# Patient Record
Sex: MALE | Race: BLACK OR AFRICAN AMERICAN | NOT HISPANIC OR LATINO | Employment: OTHER | URBAN - METROPOLITAN AREA
[De-identification: names, ages, dates, MRNs, and addresses within clinical notes are randomized per-mention and may not be internally consistent; named-entity substitution may affect disease eponyms.]

---

## 2019-01-01 ENCOUNTER — OFFICE VISIT (OUTPATIENT)
Dept: FAMILY MEDICINE CLINIC | Facility: CLINIC | Age: 64
End: 2019-01-01

## 2019-01-01 ENCOUNTER — HOSPITAL ENCOUNTER (OUTPATIENT)
Dept: CT IMAGING | Facility: HOSPITAL | Age: 64
Discharge: HOME | End: 2019-09-29
Admitting: FAMILY MEDICINE

## 2019-01-01 ENCOUNTER — HOSPITAL ENCOUNTER (EMERGENCY)
Facility: HOSPITAL | Age: 64
Discharge: HOME | End: 2019-12-21
Attending: EMERGENCY MEDICINE | Admitting: EMERGENCY MEDICINE

## 2019-01-01 ENCOUNTER — TELEPHONE (OUTPATIENT)
Dept: FAMILY MEDICINE CLINIC | Facility: CLINIC | Age: 64
End: 2019-01-01

## 2019-01-01 ENCOUNTER — OFFICE VISIT (OUTPATIENT)
Dept: GASTROENTEROLOGY | Facility: CLINIC | Age: 64
End: 2019-01-01

## 2019-01-01 VITALS
HEART RATE: 97 BPM | WEIGHT: 177.6 LBS | BODY MASS INDEX: 24.06 KG/M2 | TEMPERATURE: 97.6 F | HEIGHT: 72 IN | DIASTOLIC BLOOD PRESSURE: 80 MMHG | OXYGEN SATURATION: 97 % | SYSTOLIC BLOOD PRESSURE: 110 MMHG

## 2019-01-01 VITALS
BODY MASS INDEX: 28.04 KG/M2 | TEMPERATURE: 98.5 F | DIASTOLIC BLOOD PRESSURE: 84 MMHG | OXYGEN SATURATION: 98 % | WEIGHT: 211.6 LBS | HEIGHT: 73 IN | SYSTOLIC BLOOD PRESSURE: 110 MMHG | HEART RATE: 90 BPM

## 2019-01-01 VITALS
TEMPERATURE: 98.4 F | HEIGHT: 73 IN | WEIGHT: 177.2 LBS | SYSTOLIC BLOOD PRESSURE: 122 MMHG | BODY MASS INDEX: 23.49 KG/M2 | DIASTOLIC BLOOD PRESSURE: 72 MMHG

## 2019-01-01 VITALS
SYSTOLIC BLOOD PRESSURE: 112 MMHG | BODY MASS INDEX: 24.78 KG/M2 | DIASTOLIC BLOOD PRESSURE: 72 MMHG | OXYGEN SATURATION: 96 % | HEIGHT: 73 IN | HEART RATE: 106 BPM | TEMPERATURE: 98.8 F | WEIGHT: 187 LBS

## 2019-01-01 VITALS
WEIGHT: 180 LBS | OXYGEN SATURATION: 100 % | HEART RATE: 97 BPM | RESPIRATION RATE: 16 BRPM | DIASTOLIC BLOOD PRESSURE: 90 MMHG | TEMPERATURE: 98.6 F | BODY MASS INDEX: 24.38 KG/M2 | HEIGHT: 72 IN | SYSTOLIC BLOOD PRESSURE: 127 MMHG

## 2019-01-01 DIAGNOSIS — I10 ESSENTIAL HYPERTENSION: Primary | ICD-10-CM

## 2019-01-01 DIAGNOSIS — R63.4 UNINTENTIONAL WEIGHT LOSS: Primary | ICD-10-CM

## 2019-01-01 DIAGNOSIS — R10.30 LOWER ABDOMINAL PAIN: ICD-10-CM

## 2019-01-01 DIAGNOSIS — E87.6 HYPOKALEMIA: Primary | ICD-10-CM

## 2019-01-01 DIAGNOSIS — R63.4 UNINTENTIONAL WEIGHT LOSS: ICD-10-CM

## 2019-01-01 DIAGNOSIS — R63.4 WEIGHT LOSS, ABNORMAL: ICD-10-CM

## 2019-01-01 DIAGNOSIS — K59.04 CHRONIC IDIOPATHIC CONSTIPATION: ICD-10-CM

## 2019-01-01 DIAGNOSIS — R10.84 GENERALIZED ABDOMINAL PAIN: ICD-10-CM

## 2019-01-01 DIAGNOSIS — K59.01 SLOW TRANSIT CONSTIPATION: Primary | ICD-10-CM

## 2019-01-01 DIAGNOSIS — C61 PROSTATE CANCER (HCC): ICD-10-CM

## 2019-01-01 DIAGNOSIS — R10.13 EPIGASTRIC PAIN: Primary | ICD-10-CM

## 2019-01-01 LAB
ALBUMIN SERPL-MCNC: 4.1 G/DL (ref 3.5–5.2)
ALBUMIN SERPL-MCNC: 4.6 G/DL (ref 3.6–4.8)
ALBUMIN/GLOB SERPL: 1.3 G/DL
ALBUMIN/GLOB SERPL: 1.5 {RATIO} (ref 1.2–2.2)
ALP SERPL-CCNC: 61 U/L (ref 39–117)
ALP SERPL-CCNC: 82 IU/L (ref 39–117)
ALT SERPL W P-5'-P-CCNC: 17 U/L (ref 1–41)
ALT SERPL-CCNC: 10 IU/L (ref 0–44)
ANION GAP SERPL CALCULATED.3IONS-SCNC: 13.3 MMOL/L (ref 5–15)
AST SERPL-CCNC: 15 U/L (ref 1–40)
AST SERPL-CCNC: 16 IU/L (ref 0–40)
BACTERIA UR QL AUTO: ABNORMAL /HPF
BASOPHILS # BLD AUTO: 0 X10E3/UL (ref 0–0.2)
BASOPHILS # BLD AUTO: 0.03 10*3/MM3 (ref 0–0.2)
BASOPHILS NFR BLD AUTO: 0 %
BASOPHILS NFR BLD AUTO: 0.3 % (ref 0–1.5)
BILIRUB SERPL-MCNC: 1.2 MG/DL (ref 0–1.2)
BILIRUB SERPL-MCNC: 1.3 MG/DL (ref 0.2–1.2)
BILIRUB UR QL STRIP: NEGATIVE
BUN BLD-MCNC: 16 MG/DL (ref 8–23)
BUN SERPL-MCNC: 11 MG/DL (ref 8–27)
BUN/CREAT SERPL: 11 (ref 7–25)
BUN/CREAT SERPL: 8 (ref 10–24)
CALCIUM SERPL-MCNC: 10.6 MG/DL (ref 8.6–10.2)
CALCIUM SPEC-SCNC: 9.7 MG/DL (ref 8.6–10.5)
CHLORIDE SERPL-SCNC: 98 MMOL/L (ref 98–107)
CHLORIDE SERPL-SCNC: 99 MMOL/L (ref 96–106)
CHOLEST SERPL-MCNC: 179 MG/DL (ref 100–199)
CHOLEST/HDLC SERPL: 2.8 RATIO (ref 0–5)
CLARITY UR: CLEAR
CO2 SERPL-SCNC: 24 MMOL/L (ref 20–29)
CO2 SERPL-SCNC: 28.7 MMOL/L (ref 22–29)
COLOR UR: YELLOW
CREAT BLD-MCNC: 1.46 MG/DL (ref 0.76–1.27)
CREAT BLDA-MCNC: 1 MG/DL (ref 0.6–1.3)
CREAT SERPL-MCNC: 1.32 MG/DL (ref 0.76–1.27)
DEPRECATED RDW RBC AUTO: 41.7 FL (ref 37–54)
EOSINOPHIL # BLD AUTO: 0.08 10*3/MM3 (ref 0–0.4)
EOSINOPHIL # BLD AUTO: 0.1 X10E3/UL (ref 0–0.4)
EOSINOPHIL NFR BLD AUTO: 0.9 % (ref 0.3–6.2)
EOSINOPHIL NFR BLD AUTO: 1 %
ERYTHROCYTE [DISTWIDTH] IN BLOOD BY AUTOMATED COUNT: 14 % (ref 12.3–15.4)
ERYTHROCYTE [DISTWIDTH] IN BLOOD BY AUTOMATED COUNT: 14.7 % (ref 12.3–15.4)
GFR SERPL CREATININE-BSD FRML MDRD: 59 ML/MIN/1.73
GLOBULIN SER CALC-MCNC: 3.1 G/DL (ref 1.5–4.5)
GLOBULIN UR ELPH-MCNC: 3.2 GM/DL
GLUCOSE BLD-MCNC: 106 MG/DL (ref 65–99)
GLUCOSE SERPL-MCNC: 87 MG/DL (ref 65–99)
GLUCOSE UR STRIP-MCNC: NEGATIVE MG/DL
HCT VFR BLD AUTO: 42.8 % (ref 37.5–51)
HCT VFR BLD AUTO: 44 % (ref 37.5–51)
HCV AB S/CO SERPL IA: <0.1 S/CO RATIO (ref 0–0.9)
HDLC SERPL-MCNC: 64 MG/DL
HGB BLD-MCNC: 14.4 G/DL (ref 13–17.7)
HGB BLD-MCNC: 14.7 G/DL (ref 13–17.7)
HGB UR QL STRIP.AUTO: ABNORMAL
HYALINE CASTS UR QL AUTO: ABNORMAL /LPF
IMM GRANULOCYTES # BLD AUTO: 0 X10E3/UL (ref 0–0.1)
IMM GRANULOCYTES # BLD AUTO: 0.04 10*3/MM3 (ref 0–0.05)
IMM GRANULOCYTES NFR BLD AUTO: 0 %
IMM GRANULOCYTES NFR BLD AUTO: 0.5 % (ref 0–0.5)
KETONES UR QL STRIP: ABNORMAL
LDLC SERPL CALC-MCNC: 103 MG/DL (ref 0–99)
LEUKOCYTE ESTERASE UR QL STRIP.AUTO: ABNORMAL
LIPASE SERPL-CCNC: 60 U/L (ref 13–60)
LYMPHOCYTES # BLD AUTO: 1.43 10*3/MM3 (ref 0.7–3.1)
LYMPHOCYTES # BLD AUTO: 1.6 X10E3/UL (ref 0.7–3.1)
LYMPHOCYTES NFR BLD AUTO: 16.3 % (ref 19.6–45.3)
LYMPHOCYTES NFR BLD AUTO: 19 %
MCH RBC QN AUTO: 27.3 PG (ref 26.6–33)
MCH RBC QN AUTO: 28.6 PG (ref 26.6–33)
MCHC RBC AUTO-ENTMCNC: 32.7 G/DL (ref 31.5–35.7)
MCHC RBC AUTO-ENTMCNC: 34.3 G/DL (ref 31.5–35.7)
MCV RBC AUTO: 83.3 FL (ref 79–97)
MCV RBC AUTO: 84 FL (ref 79–97)
MONOCYTES # BLD AUTO: 0.6 X10E3/UL (ref 0.1–0.9)
MONOCYTES # BLD AUTO: 0.8 10*3/MM3 (ref 0.1–0.9)
MONOCYTES NFR BLD AUTO: 7 %
MONOCYTES NFR BLD AUTO: 9.1 % (ref 5–12)
NEUTROPHILS # BLD AUTO: 6.4 X10E3/UL (ref 1.4–7)
NEUTROPHILS # BLD AUTO: 6.42 10*3/MM3 (ref 1.7–7)
NEUTROPHILS NFR BLD AUTO: 72.9 % (ref 42.7–76)
NEUTROPHILS NFR BLD AUTO: 73 %
NITRITE UR QL STRIP: NEGATIVE
NRBC BLD AUTO-RTO: 0 /100 WBC (ref 0–0.2)
PH UR STRIP.AUTO: 6 [PH] (ref 5–8)
PLATELET # BLD AUTO: 235 10*3/MM3 (ref 140–450)
PLATELET # BLD AUTO: 280 X10E3/UL (ref 150–450)
PMV BLD AUTO: 10.2 FL (ref 6–12)
POTASSIUM BLD-SCNC: 3.6 MMOL/L (ref 3.5–5.2)
POTASSIUM SERPL-SCNC: 4 MMOL/L (ref 3.5–5.2)
PROT SERPL-MCNC: 7.3 G/DL (ref 6–8.5)
PROT SERPL-MCNC: 7.7 G/DL (ref 6–8.5)
PROT UR QL STRIP: NEGATIVE
PSA SERPL-MCNC: <0.1 NG/ML (ref 0–4)
RBC # BLD AUTO: 5.14 10*6/MM3 (ref 4.14–5.8)
RBC # BLD AUTO: 5.27 X10E6/UL (ref 4.14–5.8)
RBC # UR: ABNORMAL /HPF
REF LAB TEST METHOD: ABNORMAL
SODIUM BLD-SCNC: 140 MMOL/L (ref 136–145)
SODIUM SERPL-SCNC: 144 MMOL/L (ref 134–144)
SP GR UR STRIP: 1.02 (ref 1–1.03)
SQUAMOUS #/AREA URNS HPF: ABNORMAL /HPF
TRIGL SERPL-MCNC: 60 MG/DL (ref 0–149)
TSH SERPL DL<=0.005 MIU/L-ACNC: 0.72 UIU/ML (ref 0.45–4.5)
UROBILINOGEN UR QL STRIP: ABNORMAL
VLDLC SERPL CALC-MCNC: 12 MG/DL (ref 5–40)
WBC # BLD AUTO: 8.8 X10E3/UL (ref 3.4–10.8)
WBC NRBC COR # BLD: 8.8 10*3/MM3 (ref 3.4–10.8)
WBC UR QL AUTO: ABNORMAL /HPF

## 2019-01-01 PROCEDURE — 83690 ASSAY OF LIPASE: CPT | Performed by: PHYSICIAN ASSISTANT

## 2019-01-01 PROCEDURE — 71260 CT THORAX DX C+: CPT

## 2019-01-01 PROCEDURE — 82565 ASSAY OF CREATININE: CPT

## 2019-01-01 PROCEDURE — 80053 COMPREHEN METABOLIC PANEL: CPT | Performed by: PHYSICIAN ASSISTANT

## 2019-01-01 PROCEDURE — 99214 OFFICE O/P EST MOD 30 MIN: CPT | Performed by: FAMILY MEDICINE

## 2019-01-01 PROCEDURE — 81001 URINALYSIS AUTO W/SCOPE: CPT | Performed by: PHYSICIAN ASSISTANT

## 2019-01-01 PROCEDURE — 85025 COMPLETE CBC W/AUTO DIFF WBC: CPT | Performed by: PHYSICIAN ASSISTANT

## 2019-01-01 PROCEDURE — 74177 CT ABD & PELVIS W/CONTRAST: CPT

## 2019-01-01 PROCEDURE — 25010000002 IOPAMIDOL 61 % SOLUTION: Performed by: FAMILY MEDICINE

## 2019-01-01 PROCEDURE — 99204 OFFICE O/P NEW MOD 45 MIN: CPT | Performed by: INTERNAL MEDICINE

## 2019-01-01 PROCEDURE — 99283 EMERGENCY DEPT VISIT LOW MDM: CPT

## 2019-01-01 PROCEDURE — 99213 OFFICE O/P EST LOW 20 MIN: CPT | Performed by: FAMILY MEDICINE

## 2019-01-01 RX ORDER — POTASSIUM CHLORIDE 750 MG/1
10 TABLET, FILM COATED, EXTENDED RELEASE ORAL DAILY
Qty: 90 TABLET | Refills: 3
Start: 2019-01-01 | End: 2019-01-01 | Stop reason: SDUPTHER

## 2019-01-01 RX ORDER — HYDROCHLOROTHIAZIDE 25 MG/1
25 TABLET ORAL
COMMUNITY
End: 2019-01-01 | Stop reason: SDUPTHER

## 2019-01-01 RX ORDER — TRIAMCINOLONE ACETONIDE 1 MG/G
CREAM TOPICAL 2 TIMES DAILY
Start: 2019-01-01 | End: 2019-01-01 | Stop reason: SDUPTHER

## 2019-01-01 RX ORDER — HYDROCHLOROTHIAZIDE 25 MG/1
25 TABLET ORAL DAILY
Qty: 90 TABLET | Refills: 3 | Status: SHIPPED | OUTPATIENT
Start: 2019-01-01 | End: 2020-01-01

## 2019-01-01 RX ORDER — SENNA AND DOCUSATE SODIUM 50; 8.6 MG/1; MG/1
2 TABLET, FILM COATED ORAL DAILY
Qty: 60 TABLET | Refills: 3 | Status: SHIPPED | OUTPATIENT
Start: 2019-01-01

## 2019-01-01 RX ORDER — TRIAMCINOLONE ACETONIDE 1 MG/G
CREAM TOPICAL 2 TIMES DAILY
Qty: 80 G | Refills: 2
Start: 2019-01-01 | End: 2019-01-01 | Stop reason: SDUPTHER

## 2019-01-01 RX ORDER — TRIAMCINOLONE ACETONIDE 1 MG/G
CREAM TOPICAL 2 TIMES DAILY
Qty: 80 G | Refills: 2 | Status: SHIPPED | OUTPATIENT
Start: 2019-01-01

## 2019-01-01 RX ORDER — PANTOPRAZOLE SODIUM 40 MG/1
40 TABLET, DELAYED RELEASE ORAL DAILY
Qty: 30 TABLET | Refills: 4 | Status: SHIPPED | OUTPATIENT
Start: 2019-01-01 | End: 2020-01-01 | Stop reason: SDUPTHER

## 2019-01-01 RX ORDER — TAMSULOSIN HYDROCHLORIDE 0.4 MG/1
1 CAPSULE ORAL DAILY
Qty: 30 CAPSULE
Start: 2019-01-01 | End: 2019-01-01

## 2019-01-01 RX ORDER — OMEPRAZOLE 40 MG/1
40 CAPSULE, DELAYED RELEASE ORAL DAILY
Start: 2019-01-01 | End: 2019-01-01

## 2019-01-01 RX ORDER — NAPROXEN 500 MG/1
500 TABLET ORAL 2 TIMES DAILY WITH MEALS
Start: 2019-01-01 | End: 2019-01-01

## 2019-01-01 RX ORDER — POTASSIUM CHLORIDE 750 MG/1
10 TABLET, FILM COATED, EXTENDED RELEASE ORAL DAILY
Start: 2019-01-01 | End: 2019-01-01 | Stop reason: SDUPTHER

## 2019-01-01 RX ORDER — FERROUS SULFATE 325(65) MG
325 TABLET ORAL
Start: 2019-01-01 | End: 2019-01-01

## 2019-01-01 RX ORDER — POTASSIUM CHLORIDE 750 MG/1
10 TABLET, FILM COATED, EXTENDED RELEASE ORAL DAILY
Qty: 90 TABLET | Refills: 3 | Status: SHIPPED | OUTPATIENT
Start: 2019-01-01 | End: 2020-01-01

## 2019-01-01 RX ADMIN — IOPAMIDOL 75 ML: 612 INJECTION, SOLUTION INTRAVENOUS at 11:25

## 2019-09-16 PROBLEM — R63.4 UNINTENTIONAL WEIGHT LOSS: Status: ACTIVE | Noted: 2019-01-01

## 2019-09-16 PROBLEM — Z96.642 HISTORY OF TOTAL LEFT HIP ARTHROPLASTY: Status: ACTIVE | Noted: 2018-07-22

## 2019-09-16 NOTE — PROGRESS NOTES
Chief Complaint   Patient presents with   • Hypertension   • Weight Loss       Subjective   Leander Mayes is a 64 y.o. male.     History of Present Illness     F/U HTN.  No orthostasis.  On meds regulalry.    C/o loss of weight over last 18 months. 76 lbs weight loss.   Not seen by me per pt report since 4/18.  Has had 6 surgeries during that time.  Loss of appetite as well. I cannot gain weight.  I am feeling good though.  F/U prostate ca.  S/p prostatectomy.  Sees Dr Hanson.       The following portions of the patient's history were reviewed and updated as appropriate: allergies, current medications, past family history, past medical history, past social history, past surgical history and problem list.    Review of Systems   Constitutional: Positive for appetite change and unexpected weight loss. Negative for fatigue.   HENT: Negative for nosebleeds and sore throat.    Eyes: Negative for blurred vision and visual disturbance.   Respiratory: Negative for shortness of breath and wheezing.    Cardiovascular: Negative for chest pain and leg swelling.   Gastrointestinal: Negative for abdominal distention and abdominal pain.   Endocrine: Negative for cold intolerance and polyuria.   Genitourinary: Negative for dysuria and hematuria.   Musculoskeletal: Negative for arthralgias and myalgias.   Skin: Negative for color change and rash.   Neurological: Negative for weakness and confusion.   Psychiatric/Behavioral: Negative for agitation and depressed mood.       Patient Active Problem List   Diagnosis   • Venous stasis   • Stage 2 chronic kidney disease   • Spondylosis without myelopathy or radiculopathy, lumbar region   • Spinal stenosis, lumbosacral region   • Renal cyst, acquired   • Prostate cancer (CMS/HCC)   • Osteoarthritis of knees, bilateral   • Inability to attain erection   • Hypokalemia   • Hypertension   • High risk medication use   • GERD without esophagitis   • Depression   • Degenerative disc disease, lumbar    • Anemia   • BPH (benign prostatic hyperplasia)   • History of total left hip arthroplasty   • Unintentional weight loss       No Known Allergies      Current Outpatient Medications:   •  aspirin 81 MG tablet, Take 1 tablet by mouth Daily., Disp: , Rfl:   •  hydrochlorothiazide (HYDRODIURIL) 25 MG tablet, Take 25 mg by mouth., Disp: , Rfl:   •  potassium chloride (K-DUR) 10 MEQ CR tablet, Take 1 tablet by mouth Daily., Disp: , Rfl:   •  triamcinolone (KENALOG) 0.1 % cream, Apply  topically to the appropriate area as directed 2 (Two) Times a Day., Disp: , Rfl:     Past Medical History:   Diagnosis Date   • Anemia    • Arthritis    • Arthritis of left hip    • BPH (benign prostatic hyperplasia)    • Degenerative disc disease, lumbar    • Depression    • GERD without esophagitis    • Hematochezia    • High risk medication use    • Hip fracture (CMS/HCC)    • Hypertension    • Hypokalemia    • Inability to attain erection    • Lumbago with sciatica    • Osteoarthritis    • Osteoarthritis of knees, bilateral    • Postprocedural seroma of skin and subcutaneous tissue following other procedure    • Prostate cancer (CMS/HCC)    • Rash    • Renal cyst, acquired    • Rib fracture    • Right inguinal hernia    • Rotator cuff tendonitis, right    • Spinal stenosis, lumbosacral region    • Spondylosis without myelopathy or radiculopathy, lumbar region    • Stage 2 chronic kidney disease    • Umbilical hernia    • Venous stasis        Past Surgical History:   Procedure Laterality Date   • COLONOSCOPY  12/2016    normal   • INGUINAL HERNIA REPAIR Right    • KNEE SURGERY     • SUPRAPUBIC PROSTATECTOMY     • TOTAL HIP ARTHROPLASTY Bilateral        Family History   Problem Relation Age of Onset   • Diabetes type I Mother    • Hypertension Mother    • Alcohol abuse Father    • Arthritis Maternal Grandmother    • Arthritis Paternal Grandmother    • No Known Problems Other        Social History     Tobacco Use   • Smoking status:  Never Smoker   • Smokeless tobacco: Never Used   Substance Use Topics   • Alcohol use: Yes     Comment: Occasionally-drinks alcohol, 7 or less drinks per week            Objective     Vitals:    09/16/19 1512   BP: 110/84   Pulse: 90   Temp: 98.5 °F (36.9 °C)   SpO2: 98%     Body mass index is 27.92 kg/m².    Physical Exam   Constitutional: He is oriented to person, place, and time. He appears well-developed and well-nourished.   HENT:   Head: Normocephalic and atraumatic.   Right Ear: External ear normal.   Left Ear: External ear normal.   Nose: Nose normal.   Mouth/Throat: Oropharynx is clear and moist.   Eyes: Conjunctivae and EOM are normal. Pupils are equal, round, and reactive to light.   Neck: Normal range of motion. Neck supple. No tracheal deviation present. No thyromegaly present.   Cardiovascular: Normal rate, regular rhythm and normal heart sounds. Exam reveals no gallop and no friction rub.   No murmur heard.  Pulmonary/Chest: Effort normal and breath sounds normal. No respiratory distress. He exhibits no tenderness.   Abdominal: Soft. Bowel sounds are normal. He exhibits no distension. There is no tenderness.   Musculoskeletal: Normal range of motion. He exhibits no edema or tenderness.   Lymphadenopathy:     He has no cervical adenopathy.   Neurological: He is alert and oriented to person, place, and time. He displays normal reflexes. No cranial nerve deficit or sensory deficit. Coordination normal.   Skin: Skin is warm and dry.   Psychiatric: He has a normal mood and affect. His behavior is normal. Judgment and thought content normal.   Nursing note and vitals reviewed.      Lab Results   Component Value Date    GLUCOSE 99 06/05/2014    BUN 19 04/22/2019    CREATININE 1.3 04/22/2019    BCR 14.6 04/22/2019    K 3.4 (L) 04/22/2019    CO2 31 (H) 04/22/2019    CALCIUM 8.9 04/22/2019    ALBUMIN 3.9 04/22/2019    LABIL2 1.1 04/22/2019    AST 18 04/22/2019    ALT 13 04/22/2019       WBC   Date Value Ref  Range Status   03/26/2019 8.32 4.5 - 11.0 10*3/uL Final     RBC   Date Value Ref Range Status   03/26/2019 5.19 4.5 - 5.9 10*6/uL Final     Hemoglobin   Date Value Ref Range Status   04/22/2019 13.8 13.5 - 17.5 g/dL Final     Hematocrit   Date Value Ref Range Status   04/22/2019 42.3 41.0 - 53.0 % Final     MCV   Date Value Ref Range Status   03/26/2019 85.2 80.0 - 100.0 fL Final     MCH   Date Value Ref Range Status   03/26/2019 26.6 26.0 - 34.0 pg Final     MCHC   Date Value Ref Range Status   03/26/2019 31.2 31.0 - 37.0 g/dL Final     RDW   Date Value Ref Range Status   03/26/2019 17.0 (H) 12.0 - 16.8 % Final     MPV   Date Value Ref Range Status   03/26/2019 9.8 6.7 - 10.8 fL Final     Platelets   Date Value Ref Range Status   03/26/2019 231 140 - 440 10*3/uL Final     Neutrophil Rel %   Date Value Ref Range Status   03/26/2019 74.7 45 - 80 % Final     Lymphocyte Rel %   Date Value Ref Range Status   03/26/2019 17.2 15 - 50 % Final     Monocyte Rel %   Date Value Ref Range Status   03/26/2019 5.8 0 - 15 % Final     Eosinophil %   Date Value Ref Range Status   03/26/2019 2.0 0 - 7 % Final     Basophil Rel %   Date Value Ref Range Status   03/26/2019 0.2 0 - 2 % Final     Immature Grans %   Date Value Ref Range Status   03/26/2019 0.1 (H) 0 % Final     Neutrophils Absolute   Date Value Ref Range Status   03/26/2019 6.21 2.0 - 8.8 10*3/uL Final     Lymphocytes Absolute   Date Value Ref Range Status   03/26/2019 1.43 0.7 - 5.5 10*3/uL Final     Monocytes Absolute   Date Value Ref Range Status   03/26/2019 0.48 0.0 - 1.7 10*3/uL Final     Eosinophils Absolute   Date Value Ref Range Status   03/26/2019 0.17 0.0 - 0.8 10*3/uL Final     Basophils Absolute   Date Value Ref Range Status   03/26/2019 0.02 0.0 - 0.2 10*3/uL Final     Immature Grans, Absolute   Date Value Ref Range Status   03/26/2019 0.01 <1 10*3/uL Final     nRBC   Date Value Ref Range Status   03/26/2019 0 0 /100(WBC) Final       No results found for:  HGBA1C    Lab Results   Component Value Date    [id number] 864 (H) 07/22/2018       No results found for: TSH    No results found for: CHOL  No results found for: TRIG  No results found for: HDL  No results found for: LDL  No results found for: VLDL  No results found for: LDLHDL      Procedures    Assessment/Plan   Problems Addressed this Visit        Cardiovascular and Mediastinum    Hypertension - Primary    Relevant Medications    hydrochlorothiazide (HYDRODIURIL) 25 MG tablet       Genitourinary    Prostate cancer (CMS/HCC)       Other    Unintentional weight loss          No orders of the defined types were placed in this encounter.      Current Outpatient Medications   Medication Sig Dispense Refill   • aspirin 81 MG tablet Take 1 tablet by mouth Daily.     • hydrochlorothiazide (HYDRODIURIL) 25 MG tablet Take 25 mg by mouth.     • potassium chloride (K-DUR) 10 MEQ CR tablet Take 1 tablet by mouth Daily.     • triamcinolone (KENALOG) 0.1 % cream Apply  topically to the appropriate area as directed 2 (Two) Times a Day.       No current facility-administered medications for this visit.        Leander Mayes had no medications administered during this visit.    Return in about 3 months (around 12/16/2019).    There are no Patient Instructions on file for this visit.

## 2019-09-30 NOTE — TELEPHONE ENCOUNTER
Can you please resend the potassium chloride (K-DUR) 10 MEQ CR tablet. It was sent to print instead of [facility]. When resending please only send a 30 day supply to [facility]. Then send a 90 day supply to [facility].   Please note he is out of medication.    Pt also is inquiring about his CT results

## 2019-12-03 PROBLEM — K59.04 CHRONIC IDIOPATHIC CONSTIPATION: Status: ACTIVE | Noted: 2019-01-01

## 2019-12-03 PROBLEM — R10.84 GENERALIZED ABDOMINAL PAIN: Status: ACTIVE | Noted: 2019-01-01

## 2019-12-03 NOTE — PROGRESS NOTES
Chief Complaint   Patient presents with   • Constipation   • decrease of appetite       Subjective   Leander FREEDMAN Young is a 64 y.o. male.     History of Present Illness   C/o trouble with loss of wt with abdominal pain for months. About 3 months.  Review of wt with 24 lb since mid 9/19.  Some throwing up about 1 am many nights.   Increaseing severity.  Decreased appetite. Rare BM now.  Lot of constipation.   Review of labs with PSA normal,    TSH normal, Elevated Ca at 10.6, Cr 1.32.  LFTs normal.  CBC normal.  CT chest/abd/pelvis with multiple hepatic cysts and renal cyst and mod chronic diverticulosis.  Extensive multi level DDD.  Normal C scope 12/2016.  NO HA or vision changes.      The following portions of the patient's history were reviewed and updated as appropriate: allergies, current medications, past family history, past medical history, past social history, past surgical history and problem list.    Review of Systems   Constitutional: Positive for appetite change and unexpected weight loss. Negative for chills and fatigue.   HENT: Negative for nosebleeds and sore throat.    Eyes: Negative for blurred vision and visual disturbance.   Respiratory: Negative for shortness of breath and wheezing.    Cardiovascular: Negative for chest pain and leg swelling.   Gastrointestinal: Positive for abdominal pain and constipation. Negative for abdominal distention and diarrhea.   Endocrine: Negative for cold intolerance and polyuria.   Genitourinary: Negative for dysuria and hematuria.   Musculoskeletal: Negative for arthralgias and myalgias.   Skin: Negative for color change and rash.   Neurological: Negative for weakness and confusion.   Psychiatric/Behavioral: Negative for agitation and depressed mood.       Patient Active Problem List   Diagnosis   • Venous stasis   • Stage 2 chronic kidney disease   • Spondylosis without myelopathy or radiculopathy, lumbar region   • Spinal stenosis, lumbosacral region   • Renal cyst,  acquired   • Prostate cancer (CMS/HCC)   • Osteoarthritis of knees, bilateral   • Inability to attain erection   • Hypokalemia   • Hypertension   • High risk medication use   • GERD without esophagitis   • Depression   • Degenerative disc disease, lumbar   • Anemia   • BPH (benign prostatic hyperplasia)   • History of total left hip arthroplasty   • Unintentional weight loss   • Chronic idiopathic constipation   • Generalized abdominal pain       No Known Allergies      Current Outpatient Medications:   •  hydrochlorothiazide (HYDRODIURIL) 25 MG tablet, Take 1 tablet by mouth Daily., Disp: 90 tablet, Rfl: 3  •  potassium chloride (K-DUR) 10 MEQ CR tablet, Take 1 tablet by mouth Daily., Disp: 90 tablet, Rfl: 3  •  triamcinolone (KENALOG) 0.1 % cream, Apply  topically to the appropriate area as directed 2 (Two) Times a Day., Disp: 80 g, Rfl: 2  •  pantoprazole (PROTONIX) 40 MG EC tablet, Take 1 tablet by mouth Daily., Disp: 30 tablet, Rfl: 4  •  senna-docusate sodium (SENOKOT-S) 8.6-50 MG tablet, Take 2 tablets by mouth Daily. For constipation, Disp: 60 tablet, Rfl: 3    Past Medical History:   Diagnosis Date   • Anemia    • Arthritis    • Arthritis of left hip    • BPH (benign prostatic hyperplasia)    • Degenerative disc disease, lumbar    • Depression    • GERD without esophagitis    • Hematochezia    • High risk medication use    • Hip fracture (CMS/HCC)    • Hypertension    • Hypokalemia    • Inability to attain erection    • Lumbago with sciatica    • Osteoarthritis    • Osteoarthritis of knees, bilateral    • Postprocedural seroma of skin and subcutaneous tissue following other procedure    • Prostate cancer (CMS/HCC)    • Rash    • Renal cyst, acquired    • Rib fracture    • Right inguinal hernia    • Rotator cuff tendonitis, right    • Spinal stenosis, lumbosacral region    • Spondylosis without myelopathy or radiculopathy, lumbar region    • Stage 2 chronic kidney disease    • Umbilical hernia    • Venous  stasis        Past Surgical History:   Procedure Laterality Date   • COLONOSCOPY  12/2016    normal   • INGUINAL HERNIA REPAIR Right    • KNEE SURGERY     • SUPRAPUBIC PROSTATECTOMY     • TOTAL HIP ARTHROPLASTY Bilateral        Family History   Problem Relation Age of Onset   • Diabetes type I Mother    • Hypertension Mother    • Alcohol abuse Father    • Arthritis Maternal Grandmother    • Arthritis Paternal Grandmother    • No Known Problems Other        Social History     Tobacco Use   • Smoking status: Never Smoker   • Smokeless tobacco: Never Used   Substance Use Topics   • Alcohol use: Yes     Comment: Occasionally-drinks alcohol, 7 or less drinks per week            Objective     Vitals:    12/03/19 1505   BP: 112/72   Pulse: 106   Temp: 98.8 °F (37.1 °C)   SpO2: 96%     Body mass index is 24.67 kg/m².    Physical Exam   Constitutional: He is oriented to person, place, and time. He appears well-developed and well-nourished.   HENT:   Head: Normocephalic and atraumatic.   Right Ear: External ear normal.   Left Ear: External ear normal.   Nose: Nose normal.   Mouth/Throat: Oropharynx is clear and moist.   Eyes: Conjunctivae and EOM are normal. Pupils are equal, round, and reactive to light.   Neck: Normal range of motion. Neck supple. No tracheal deviation present. No thyromegaly present.   Cardiovascular: Normal rate, regular rhythm and normal heart sounds. Exam reveals no gallop and no friction rub.   No murmur heard.  Pulmonary/Chest: Effort normal and breath sounds normal. No respiratory distress. He exhibits no tenderness.   Abdominal: Soft. Bowel sounds are normal. He exhibits no distension. There is no tenderness.   Musculoskeletal: Normal range of motion. He exhibits no edema or tenderness.   Lymphadenopathy:     He has no cervical adenopathy.   Neurological: He is alert and oriented to person, place, and time. He displays normal reflexes. No cranial nerve deficit or sensory deficit. Coordination  normal.   Skin: Skin is warm and dry.   Psychiatric: He has a normal mood and affect. His behavior is normal. Judgment and thought content normal.   Nursing note and vitals reviewed.      Lab Results   Component Value Date    GLUCOSE 99 06/05/2014    BUN 11 09/16/2019    CREATININE 1.00 09/29/2019    EGFRIFNONA 57 (L) 09/16/2019    EGFRIFAFRI 66 09/16/2019    BCR 8 (L) 09/16/2019    K 4.0 09/16/2019    CO2 24 09/16/2019    CALCIUM 10.6 (H) 09/16/2019    PROTENTOTREF 7.7 09/16/2019    ALBUMIN 4.6 09/16/2019    LABIL2 1.5 09/16/2019    AST 16 09/16/2019    ALT 10 09/16/2019       WBC   Date Value Ref Range Status   09/16/2019 8.8 3.4 - 10.8 x10E3/uL Final   03/26/2019 8.32 4.5 - 11.0 10*3/uL Final     RBC   Date Value Ref Range Status   09/16/2019 5.27 4.14 - 5.80 x10E6/uL Final   03/26/2019 5.19 4.5 - 5.9 10*6/uL Final     Hemoglobin   Date Value Ref Range Status   09/16/2019 14.4 13.0 - 17.7 g/dL Final   04/22/2019 13.8 13.5 - 17.5 g/dL Final     Hematocrit   Date Value Ref Range Status   09/16/2019 44.0 37.5 - 51.0 % Final   04/22/2019 42.3 41.0 - 53.0 % Final     MCV   Date Value Ref Range Status   09/16/2019 84 79 - 97 fL Final   03/26/2019 85.2 80.0 - 100.0 fL Final     MCH   Date Value Ref Range Status   09/16/2019 27.3 26.6 - 33.0 pg Final   03/26/2019 26.6 26.0 - 34.0 pg Final     MCHC   Date Value Ref Range Status   09/16/2019 32.7 31.5 - 35.7 g/dL Final   03/26/2019 31.2 31.0 - 37.0 g/dL Final     RDW   Date Value Ref Range Status   09/16/2019 14.7 12.3 - 15.4 % Final   03/26/2019 17.0 (H) 12.0 - 16.8 % Final     MPV   Date Value Ref Range Status   03/26/2019 9.8 6.7 - 10.8 fL Final     Platelets   Date Value Ref Range Status   09/16/2019 280 150 - 450 x10E3/uL Final   03/26/2019 231 140 - 440 10*3/uL Final     Neutrophil Rel %   Date Value Ref Range Status   09/16/2019 73 Not Estab. % Final   03/26/2019 74.7 45 - 80 % Final     Lymphocyte Rel %   Date Value Ref Range Status   09/16/2019 19 Not Estab. %  Final   03/26/2019 17.2 15 - 50 % Final     Monocyte Rel %   Date Value Ref Range Status   09/16/2019 7 Not Estab. % Final   03/26/2019 5.8 0 - 15 % Final     Eosinophil Rel %   Date Value Ref Range Status   09/16/2019 1 Not Estab. % Final     Eosinophil %   Date Value Ref Range Status   03/26/2019 2.0 0 - 7 % Final     Basophil Rel %   Date Value Ref Range Status   09/16/2019 0 Not Estab. % Final   03/26/2019 0.2 0 - 2 % Final     Immature Grans %   Date Value Ref Range Status   03/26/2019 0.1 (H) 0 % Final     Neutrophils Absolute   Date Value Ref Range Status   09/16/2019 6.4 1.4 - 7.0 x10E3/uL Final   03/26/2019 6.21 2.0 - 8.8 10*3/uL Final     Lymphocytes Absolute   Date Value Ref Range Status   09/16/2019 1.6 0.7 - 3.1 x10E3/uL Final   03/26/2019 1.43 0.7 - 5.5 10*3/uL Final     Monocytes Absolute   Date Value Ref Range Status   09/16/2019 0.6 0.1 - 0.9 x10E3/uL Final   03/26/2019 0.48 0.0 - 1.7 10*3/uL Final     Eosinophils Absolute   Date Value Ref Range Status   09/16/2019 0.1 0.0 - 0.4 x10E3/uL Final   03/26/2019 0.17 0.0 - 0.8 10*3/uL Final     Basophils Absolute   Date Value Ref Range Status   09/16/2019 0.0 0.0 - 0.2 x10E3/uL Final   03/26/2019 0.02 0.0 - 0.2 10*3/uL Final     Immature Grans, Absolute   Date Value Ref Range Status   03/26/2019 0.01 <1 10*3/uL Final     nRBC   Date Value Ref Range Status   03/26/2019 0 0 /100(WBC) Final       No results found for: HGBA1C    Lab Results   Component Value Date    ZOHSRBEH14 864 (H) 07/22/2018       TSH   Date Value Ref Range Status   09/16/2019 0.716 0.450 - 4.500 uIU/mL Final       No results found for: CHOL  Lab Results   Component Value Date    TRIG 60 09/16/2019     Lab Results   Component Value Date    HDL 64 09/16/2019     Lab Results   Component Value Date     (H) 09/16/2019     Lab Results   Component Value Date    VLDL 12 09/16/2019     No results found for: LDLHDL      Procedures    Assessment/Plan   Problems Addressed this Visit         Digestive    Chronic idiopathic constipation    Relevant Medications    senna-docusate sodium (SENOKOT-S) 8.6-50 MG tablet    Other Relevant Orders    Ambulatory Referral to Gastroenterology       Nervous and Auditory    Generalized abdominal pain    Relevant Medications    pantoprazole (PROTONIX) 40 MG EC tablet    Other Relevant Orders    Ambulatory Referral to Gastroenterology       Other    Unintentional weight loss - Primary    Relevant Orders    Ambulatory Referral to Gastroenterology          Orders Placed This Encounter   Procedures   • Ambulatory Referral to Gastroenterology     Referral Priority:   Urgent     Referral Type:   Consultation     Referral Reason:   Specialty Services Required     Referred to Provider:   Adelso Mata MD     Requested Specialty:   Gastroenterology     Number of Visits Requested:   1       Current Outpatient Medications   Medication Sig Dispense Refill   • hydrochlorothiazide (HYDRODIURIL) 25 MG tablet Take 1 tablet by mouth Daily. 90 tablet 3   • potassium chloride (K-DUR) 10 MEQ CR tablet Take 1 tablet by mouth Daily. 90 tablet 3   • triamcinolone (KENALOG) 0.1 % cream Apply  topically to the appropriate area as directed 2 (Two) Times a Day. 80 g 2   • pantoprazole (PROTONIX) 40 MG EC tablet Take 1 tablet by mouth Daily. 30 tablet 4   • senna-docusate sodium (SENOKOT-S) 8.6-50 MG tablet Take 2 tablets by mouth Daily. For constipation 60 tablet 3     No current facility-administered medications for this visit.        Leander Mayes had no medications administered during this visit.    Return in about 1 month (around 1/3/2020).    There are no Patient Instructions on file for this visit.

## 2019-12-20 NOTE — PROGRESS NOTES
Chief Complaint   Patient presents with   • Weight Loss   • Abdominal Pain   • Constipation       Leander Mayes is a 64 y.o. male who presents with weight loss and abdominal pain and constipation    Mr. Mayes comes in with multiple GI complaints of spanned over 2 years.  He has lost 140 pounds in 2 years per his report but that does include multiple hip surgeries, and prostate cancer diagnosis.    He has chronic constipation with incomplete evacuation and straining with a bowel movement  Last colonoscopy was 3 years ago and he does not remember the results    He has early satiety epigastric pain that does not radiate worse after eating, chronic GERD he has never had EGD    He has occasional nausea, regurgitation no vomiting  He has no rectal bleeding  Abdominal pain also in the lower quadrants when he is constipated does not radiate it is better after bowel movements      Past Medical History:   Diagnosis Date   • Anemia    • Arthritis    • Arthritis of left hip    • BPH (benign prostatic hyperplasia)    • Degenerative disc disease, lumbar    • Depression    • GERD without esophagitis    • Hematochezia    • High risk medication use    • Hip fracture (CMS/HCC)    • Hypertension    • Hypokalemia    • Inability to attain erection    • Lumbago with sciatica    • Osteoarthritis    • Osteoarthritis of knees, bilateral    • Postprocedural seroma of skin and subcutaneous tissue following other procedure    • Prostate cancer (CMS/HCC)    • Rash    • Renal cyst, acquired    • Rib fracture    • Right inguinal hernia    • Rotator cuff tendonitis, right    • Spinal stenosis, lumbosacral region    • Spondylosis without myelopathy or radiculopathy, lumbar region    • Stage 2 chronic kidney disease    • Umbilical hernia    • Venous stasis        Past Surgical History:   Procedure Laterality Date   • COLONOSCOPY  12/2016    normal   • INGUINAL HERNIA REPAIR Right    • KNEE SURGERY     • SUPRAPUBIC PROSTATECTOMY     • TOTAL HIP  ARTHROPLASTY Bilateral          Current Outpatient Medications:   •  hydrochlorothiazide (HYDRODIURIL) 25 MG tablet, Take 1 tablet by mouth Daily., Disp: 90 tablet, Rfl: 3  •  pantoprazole (PROTONIX) 40 MG EC tablet, Take 1 tablet by mouth Daily., Disp: 30 tablet, Rfl: 4  •  potassium chloride (K-DUR) 10 MEQ CR tablet, Take 1 tablet by mouth Daily., Disp: 90 tablet, Rfl: 3  •  senna-docusate sodium (SENOKOT-S) 8.6-50 MG tablet, Take 2 tablets by mouth Daily. For constipation, Disp: 60 tablet, Rfl: 3  •  triamcinolone (KENALOG) 0.1 % cream, Apply  topically to the appropriate area as directed 2 (Two) Times a Day., Disp: 80 g, Rfl: 2    No Known Allergies    Social History     Socioeconomic History   • Marital status: Unknown     Spouse name: Not on file   • Number of children: Not on file   • Years of education: Not on file   • Highest education level: Not on file   Tobacco Use   • Smoking status: Never Smoker   • Smokeless tobacco: Never Used   Substance and Sexual Activity   • Alcohol use: Yes     Comment: Occasionally-drinks alcohol, 7 or less drinks per week   • Drug use: No   • Sexual activity: Defer       Family History   Problem Relation Age of Onset   • Diabetes type I Mother    • Hypertension Mother    • Alcohol abuse Father    • Arthritis Maternal Grandmother    • Arthritis Paternal Grandmother    • No Known Problems Other        Review of Systems   Gastrointestinal: Positive for abdominal distention, abdominal pain, constipation and nausea.   All other systems reviewed and are negative.      Vitals:    12/20/19 1221   BP: 122/72   Temp: 98.4 °F (36.9 °C)       Physical Exam   Constitutional: He is oriented to person, place, and time. He appears well-developed and well-nourished.   HENT:   Head: Normocephalic and atraumatic.   Eyes: Conjunctivae and EOM are normal.   Neck: Normal range of motion. No tracheal deviation present.   Cardiovascular: Normal rate and regular rhythm.   Pulmonary/Chest: Effort  normal and breath sounds normal. No respiratory distress.   Abdominal: Soft. Bowel sounds are normal. He exhibits no distension and no mass. There is no tenderness. There is no rebound and no guarding.   Musculoskeletal: Normal range of motion.   Neurological: He is alert and oriented to person, place, and time.   Skin: Skin is warm and dry.   Psychiatric: He has a normal mood and affect. Judgment normal.   Nursing note and vitals reviewed.    Problem list    Abdominal pain  Nausea  GERD  Constipation  Incomplete evacuation  Weight loss 140 pounds    Assessment/Plan    Based on the above issues we will plan for EGD and colonoscopy, of note noninvasive testing such as CAT scan and routine blood work has been unrevealing  We will start Linzess 145 mcg p.o. every morning for chronic constipation  I have given him FDgard to be used as needed for upper GI symptoms  He will continue Protonix  Begin a fiber supplement.  Benefiber, Citrucel or Metamucil is acceptable.  Fiber gummies are also acceptable.  Please take 1 dose of fiber in the morning and 1 in the evening for 4 weeks and increase to 2 doses of fiber in the morning and 2 in the evening after that. Please  try to maintain a high-fiber diet.  We obtain 10 g of fiber from a high-fiber diet every day.  Women should consume a total of 25 grams of fiber a day, men 30 grams a day.  We can reach the total daily recommended dose of fiber a day utilizing the high-fiber diet and also fiber supplements.  Please begin a probiotic.  You can try activia yogurt, align, or florastor.  These can be found over-the-counter at a local grocery store.

## 2019-12-21 NOTE — ED PROVIDER NOTES
EMERGENCY DEPARTMENT ENCOUNTER    Room Number:  30/30  Date of encounter:  12/21/2019  PCP: Fran De MD  Historian: Patient      HPI:  Chief Complaint: Abdominal pain  A complete HPI/ROS/PMH/PSH/SH/FH are unobtainable due to: Nothing    Context: Leander Mayes is a 64 y.o. male who presents to the ED c/o severe epigastric abdominal pain that is been intermittent for the last year to 18 months.  Patient states the pain is severe at its worst, nonradiating, gives him a sense of nausea and early satiety.  He also has chronic constipation.  The pain tonight is no different than normal other than that is worse.  He says it gets worse at night usually.    From reviewing the records, I see that the patient saw GI physician yesterday in the office and was set up for an upper GI and colonoscopy next month.  In the interim he was prescribed some promotility agents as well as an acids.    In the past he said CT scans of the abdomen and multiple work-ups for this abdominal pain without any obvious etiology.      PAST MEDICAL HISTORY  Active Ambulatory Problems     Diagnosis Date Noted   • Venous stasis    • Stage 2 chronic kidney disease    • Spondylosis without myelopathy or radiculopathy, lumbar region    • Spinal stenosis, lumbosacral region    • Renal cyst, acquired    • Prostate cancer (CMS/HCC)    • Osteoarthritis of knees, bilateral    • Inability to attain erection    • Hypokalemia    • Hypertension    • High risk medication use    • GERD without esophagitis    • Depression    • Degenerative disc disease, lumbar    • Anemia    • BPH (benign prostatic hyperplasia)    • History of total left hip arthroplasty 07/22/2018   • Unintentional weight loss 09/16/2019   • Chronic idiopathic constipation 12/03/2019   • Generalized abdominal pain 12/03/2019     Resolved Ambulatory Problems     Diagnosis Date Noted   • No Resolved Ambulatory Problems     Past Medical History:   Diagnosis Date   • Arthritis    • Arthritis of  left hip    • Hematochezia    • Hip fracture (CMS/HCC)    • Lumbago with sciatica    • Osteoarthritis    • Postprocedural seroma of skin and subcutaneous tissue following other procedure    • Rash    • Rib fracture    • Right inguinal hernia    • Rotator cuff tendonitis, right    • Umbilical hernia          PAST SURGICAL HISTORY  Past Surgical History:   Procedure Laterality Date   • COLONOSCOPY  12/2016    normal   • INGUINAL HERNIA REPAIR Right    • KNEE SURGERY     • SUPRAPUBIC PROSTATECTOMY     • TOTAL HIP ARTHROPLASTY Bilateral          FAMILY HISTORY  Family History   Problem Relation Age of Onset   • Diabetes type I Mother    • Hypertension Mother    • Alcohol abuse Father    • Arthritis Maternal Grandmother    • Arthritis Paternal Grandmother    • No Known Problems Other          SOCIAL HISTORY  Social History     Socioeconomic History   • Marital status: Unknown     Spouse name: Not on file   • Number of children: Not on file   • Years of education: Not on file   • Highest education level: Not on file   Tobacco Use   • Smoking status: Never Smoker   • Smokeless tobacco: Never Used   Substance and Sexual Activity   • Alcohol use: Yes     Comment: Occasionally-drinks alcohol, 7 or less drinks per week   • Drug use: No   • Sexual activity: Defer         ALLERGIES  Patient has no known allergies.        REVIEW OF SYSTEMS  Review of Systems     All systems reviewed and negative except for those discussed in HPI.       PHYSICAL EXAM    I have reviewed the triage vital signs and nursing notes.    ED Triage Vitals   Temp Heart Rate Resp BP SpO2   12/20/19 1954 12/20/19 1954 12/20/19 1954 12/20/19 2100 12/20/19 1954   98.6 °F (37 °C) 105 16 121/79 98 %      Temp src Heart Rate Source Patient Position BP Location FiO2 (%)   -- 12/21/19 0134 12/21/19 0132 12/21/19 0132 --    Monitor Lying Right arm        Physical Exam  GENERAL: not distressed, does not appear malnourished or dehydrated  HENT: nares patent, mucous  membranes are moist  EYES: no scleral icterus  CV: regular rhythm, regular rate  RESPIRATORY: normal effort  ABDOMEN: soft, mild epigastric tenderness without rebound or guarding, bowel sounds are present  MUSCULOSKELETAL: no deformity  NEURO: alert, moves all extremities, follows commands  SKIN: warm, dry        LAB RESULTS  Recent Results (from the past 24 hour(s))   Comprehensive Metabolic Panel    Collection Time: 12/20/19 10:33 PM   Result Value Ref Range    Glucose 106 (H) 65 - 99 mg/dL    BUN 16 8 - 23 mg/dL    Creatinine 1.46 (H) 0.76 - 1.27 mg/dL    Sodium 140 136 - 145 mmol/L    Potassium 3.6 3.5 - 5.2 mmol/L    Chloride 98 98 - 107 mmol/L    CO2 28.7 22.0 - 29.0 mmol/L    Calcium 9.7 8.6 - 10.5 mg/dL    Total Protein 7.3 6.0 - 8.5 g/dL    Albumin 4.10 3.50 - 5.20 g/dL    ALT (SGPT) 17 1 - 41 U/L    AST (SGOT) 15 1 - 40 U/L    Alkaline Phosphatase 61 39 - 117 U/L    Total Bilirubin 1.3 (H) 0.2 - 1.2 mg/dL    eGFR  African Amer 59 (L) >60 mL/min/1.73    Globulin 3.2 gm/dL    A/G Ratio 1.3 g/dL    BUN/Creatinine Ratio 11.0 7.0 - 25.0    Anion Gap 13.3 5.0 - 15.0 mmol/L   Lipase    Collection Time: 12/20/19 10:33 PM   Result Value Ref Range    Lipase 60 13 - 60 U/L   CBC Auto Differential    Collection Time: 12/20/19 10:33 PM   Result Value Ref Range    WBC 8.80 3.40 - 10.80 10*3/mm3    RBC 5.14 4.14 - 5.80 10*6/mm3    Hemoglobin 14.7 13.0 - 17.7 g/dL    Hematocrit 42.8 37.5 - 51.0 %    MCV 83.3 79.0 - 97.0 fL    MCH 28.6 26.6 - 33.0 pg    MCHC 34.3 31.5 - 35.7 g/dL    RDW 14.0 12.3 - 15.4 %    RDW-SD 41.7 37.0 - 54.0 fl    MPV 10.2 6.0 - 12.0 fL    Platelets 235 140 - 450 10*3/mm3    Neutrophil % 72.9 42.7 - 76.0 %    Lymphocyte % 16.3 (L) 19.6 - 45.3 %    Monocyte % 9.1 5.0 - 12.0 %    Eosinophil % 0.9 0.3 - 6.2 %    Basophil % 0.3 0.0 - 1.5 %    Immature Grans % 0.5 0.0 - 0.5 %    Neutrophils, Absolute 6.42 1.70 - 7.00 10*3/mm3    Lymphocytes, Absolute 1.43 0.70 - 3.10 10*3/mm3    Monocytes, Absolute 0.80  0.10 - 0.90 10*3/mm3    Eosinophils, Absolute 0.08 0.00 - 0.40 10*3/mm3    Basophils, Absolute 0.03 0.00 - 0.20 10*3/mm3    Immature Grans, Absolute 0.04 0.00 - 0.05 10*3/mm3    nRBC 0.0 0.0 - 0.2 /100 WBC   Urinalysis With Microscopic If Indicated (No Culture) - Urine, Clean Catch    Collection Time: 12/20/19 10:36 PM   Result Value Ref Range    Color, UA Yellow Yellow, Straw    Appearance, UA Clear Clear    pH, UA 6.0 5.0 - 8.0    Specific Gravity, UA 1.016 1.005 - 1.030    Glucose, UA Negative Negative    Ketones, UA 15 mg/dL (1+) (A) Negative    Bilirubin, UA Negative Negative    Blood, UA Trace (A) Negative    Protein, UA Negative Negative    Leuk Esterase, UA Trace (A) Negative    Nitrite, UA Negative Negative    Urobilinogen, UA 1.0 E.U./dL 0.2 - 1.0 E.U./dL   Urinalysis, Microscopic Only - Urine, Clean Catch    Collection Time: 12/20/19 10:36 PM   Result Value Ref Range    RBC, UA 3-5 (A) None Seen, 0-2 /HPF    WBC, UA 0-2 None Seen, 0-2 /HPF    Bacteria, UA None Seen None Seen /HPF    Squamous Epithelial Cells, UA 0-2 None Seen, 0-2 /HPF    Hyaline Casts, UA 0-2 None Seen /LPF    Methodology Automated Microscopy        Ordered the above labs and independently reviewed the results.        RADIOLOGY  No Radiology Exams Resulted Within Past 24 Hours    I ordered the above noted radiological studies. Reviewed by me and discussed with radiologist.  See dictation for official radiology interpretation.      PROCEDURES    Procedures      MEDICATIONS GIVEN IN ER    Medications - No data to display      PROGRESS, DATA ANALYSIS, CONSULTS, AND MEDICAL DECISION MAKING    All labs have been independently reviewed by me.  All radiology studies have been reviewed by me and discussed with radiologist dictating the report.   EKG's independently viewed and interpreted by me.  Discussion below represents my analysis of pertinent findings related to patient's condition, differential diagnosis, treatment plan and final  disposition.        ED Course as of Dec 21 0413   Sat Dec 21, 2019   0412 CBC unremarkable, chemistry shows mild CKD which is stable when compared to multiple previous labs.  There are a few ketones in the urine, but otherwise unremarkable    [DP]   0412 I had a lengthy conversation with the patient and do not feel that any other imaging is necessary at this point.  I think he needs to follow through with a GI physician's recommendations.  I did offer a GI cocktail and an acids to the IV but he declined.    [DP]      ED Course User Index  [DP] Tyrone Merchant MD           AS OF 4:13 AM VITALS:    BP - 127/90  HR - 97  TEMP - 98.6 °F (37 °C)  O2 SATS - 100%        DIAGNOSIS  Final diagnoses:   Epigastric pain         DISPOSITION  Discharge           Tyrone Merchant MD  12/21/19 0413

## 2019-12-30 PROBLEM — R10.30 LOWER ABDOMINAL PAIN: Status: ACTIVE | Noted: 2019-01-01

## 2019-12-30 NOTE — PROGRESS NOTES
"Chief Complaint   Patient presents with   • Abdominal Pain       Subjective   Leander FREEDMAN Young is a 64 y.o. male.     History of Present Illness   FU unintentional wt loss with Lower abdominal pain.  Pain at night.  BMs moving now.  I am constipated as well. Decreased appetite.   Scheduled for EGD and colonoscopy 1/9/20.  CT chest with oral contrast only was unremarkable.  CT  Abd/pelvis with contrast with multiple hepatic and renal cysts and mod chronic diverticulosis with   Extensive lumbar DDD.  Seen by GI and started on linzess and IB dillan.  BMs every 3rd day now.  Still down 10 lbs from 12/3/19 despite more \"ice cream and cookies\".  Review  Of labs 10 days ago with Cr 1.46, otherwise CMP normal,  CBC normal,  PSA less than 0.1 3 months ago.  Hep c neg,  TSH normal.  I drink one boost every morning.       The following portions of the patient's history were reviewed and updated as appropriate: allergies, current medications, past family history, past medical history, past social history, past surgical history and problem list.    Review of Systems   Constitutional: Negative for appetite change and fatigue.   HENT: Negative for nosebleeds and sore throat.    Eyes: Negative for blurred vision and visual disturbance.   Respiratory: Negative for shortness of breath and wheezing.    Cardiovascular: Negative for chest pain and leg swelling.   Gastrointestinal: Positive for abdominal pain and constipation. Negative for abdominal distention, blood in stool and diarrhea.   Endocrine: Negative for cold intolerance and polyuria.   Genitourinary: Negative for dysuria and hematuria.   Musculoskeletal: Negative for arthralgias and myalgias.   Skin: Negative for color change and rash.   Neurological: Negative for weakness and confusion.   Psychiatric/Behavioral: Negative for agitation and depressed mood.       Patient Active Problem List   Diagnosis   • Venous stasis   • Stage 2 chronic kidney disease   • Spondylosis without " myelopathy or radiculopathy, lumbar region   • Spinal stenosis, lumbosacral region   • Renal cyst, acquired   • Prostate cancer (CMS/HCC)   • Osteoarthritis of knees, bilateral   • Inability to attain erection   • Hypokalemia   • Hypertension   • High risk medication use   • GERD without esophagitis   • Depression   • Degenerative disc disease, lumbar   • Anemia   • BPH (benign prostatic hyperplasia)   • History of total left hip arthroplasty   • Unintentional weight loss   • Chronic idiopathic constipation   • Generalized abdominal pain   • Umbilical hernia   • Rotator cuff tendonitis, right   • Right inguinal hernia   • Rib fracture   • Rash   • Postprocedural seroma of skin and subcutaneous tissue following other procedure   • Osteoarthritis   • Lumbago with sciatica   • Hip fracture (CMS/HCC)   • Hematochezia   • Arthritis of left hip   • Arthritis   • Lower abdominal pain       No Known Allergies      Current Outpatient Medications:   •  hydrochlorothiazide (HYDRODIURIL) 25 MG tablet, Take 1 tablet by mouth Daily., Disp: 90 tablet, Rfl: 3  •  potassium chloride (K-DUR) 10 MEQ CR tablet, Take 1 tablet by mouth Daily., Disp: 90 tablet, Rfl: 3  •  ferrous sulfate 325 (65 FE) MG tablet, Take 325 mg by mouth Daily With Breakfast., Disp: , Rfl:   •  HYDROcodone-acetaminophen (NORCO) 7.5-325 MG per tablet, Take 1 tablet by mouth Take As Directed., Disp: , Rfl:   •  pantoprazole (PROTONIX) 40 MG EC tablet, Take 1 tablet by mouth Daily., Disp: 30 tablet, Rfl: 4  •  senna-docusate sodium (SENOKOT-S) 8.6-50 MG tablet, Take 2 tablets by mouth Daily. For constipation, Disp: 60 tablet, Rfl: 3  •  tamsulosin (FLOMAX) 0.4 MG capsule 24 hr capsule, Take 1 capsule by mouth Daily., Disp: , Rfl:   •  triamcinolone (KENALOG) 0.1 % cream, Apply  topically to the appropriate area as directed 2 (Two) Times a Day., Disp: 80 g, Rfl: 2    Past Medical History:   Diagnosis Date   • Anemia    • Arthritis    • Arthritis of left hip    • BPH  (benign prostatic hyperplasia)    • Degenerative disc disease, lumbar    • Depression    • GERD without esophagitis    • Hematochezia    • High risk medication use    • Hip fracture (CMS/HCC)    • Hypertension    • Hypokalemia    • Inability to attain erection    • Lumbago with sciatica    • Osteoarthritis    • Osteoarthritis of knees, bilateral    • Postprocedural seroma of skin and subcutaneous tissue following other procedure    • Prostate cancer (CMS/HCC)    • Rash    • Renal cyst, acquired    • Rib fracture    • Right inguinal hernia    • Rotator cuff tendonitis, right    • Spinal stenosis, lumbosacral region    • Spondylosis without myelopathy or radiculopathy, lumbar region    • Stage 2 chronic kidney disease    • Umbilical hernia    • Venous stasis        Past Surgical History:   Procedure Laterality Date   • COLONOSCOPY  12/2016    normal   • INGUINAL HERNIA REPAIR Right    • KNEE SURGERY     • SUPRAPUBIC PROSTATECTOMY     • TOTAL HIP ARTHROPLASTY Bilateral        Family History   Problem Relation Age of Onset   • Diabetes type I Mother    • Hypertension Mother    • Alcohol abuse Father    • Arthritis Maternal Grandmother    • Arthritis Paternal Grandmother    • No Known Problems Other        Social History     Tobacco Use   • Smoking status: Never Smoker   • Smokeless tobacco: Never Used   Substance Use Topics   • Alcohol use: Yes     Comment: Occasionally-drinks alcohol, 7 or less drinks per week            Objective     Vitals:    12/30/19 1329   BP: 110/80   Pulse: 97   Temp: 97.6 °F (36.4 °C)   SpO2: 97%     Body mass index is 24.09 kg/m².    Physical Exam   Constitutional: He is oriented to person, place, and time. He appears well-developed and well-nourished.   HENT:   Head: Normocephalic and atraumatic.   Right Ear: External ear normal.   Left Ear: External ear normal.   Nose: Nose normal.   Mouth/Throat: Oropharynx is clear and moist.   Eyes: Pupils are equal, round, and reactive to light.  Conjunctivae and EOM are normal.   Neck: Normal range of motion. Neck supple. No tracheal deviation present. No thyromegaly present.   Cardiovascular: Normal rate, regular rhythm and normal heart sounds. Exam reveals no gallop and no friction rub.   No murmur heard.  Pulmonary/Chest: Effort normal and breath sounds normal. No respiratory distress. He exhibits no tenderness.   Abdominal: Soft. Bowel sounds are normal. He exhibits no distension. There is no tenderness.   Musculoskeletal: Normal range of motion. He exhibits no edema or tenderness.   Lymphadenopathy:     He has no cervical adenopathy.   Neurological: He is alert and oriented to person, place, and time. He displays normal reflexes. No cranial nerve deficit or sensory deficit. Coordination normal.   Skin: Skin is warm and dry.   Psychiatric: He has a normal mood and affect. His behavior is normal. Judgment and thought content normal.   Nursing note and vitals reviewed.      Lab Results   Component Value Date    GLUCOSE 106 (H) 12/20/2019    BUN 16 12/20/2019    CREATININE 1.46 (H) 12/20/2019    EGFRIFNONA 57 (L) 09/16/2019    EGFRIFAFRI 59 (L) 12/20/2019    BCR 11.0 12/20/2019    K 3.6 12/20/2019    CO2 28.7 12/20/2019    CALCIUM 9.7 12/20/2019    PROTENTOTREF 7.7 09/16/2019    ALBUMIN 4.10 12/20/2019    LABIL2 1.5 09/16/2019    AST 15 12/20/2019    ALT 17 12/20/2019       WBC   Date Value Ref Range Status   12/20/2019 8.80 3.40 - 10.80 10*3/mm3 Final   09/16/2019 8.8 3.4 - 10.8 x10E3/uL Final   03/26/2019 8.32 4.5 - 11.0 10*3/uL Final     RBC   Date Value Ref Range Status   12/20/2019 5.14 4.14 - 5.80 10*6/mm3 Final   09/16/2019 5.27 4.14 - 5.80 x10E6/uL Final   03/26/2019 5.19 4.5 - 5.9 10*6/uL Final     Hemoglobin   Date Value Ref Range Status   12/20/2019 14.7 13.0 - 17.7 g/dL Final   04/22/2019 13.8 13.5 - 17.5 g/dL Final     Hematocrit   Date Value Ref Range Status   12/20/2019 42.8 37.5 - 51.0 % Final   04/22/2019 42.3 41.0 - 53.0 % Final     MCV    Date Value Ref Range Status   12/20/2019 83.3 79.0 - 97.0 fL Final   03/26/2019 85.2 80.0 - 100.0 fL Final     MCH   Date Value Ref Range Status   12/20/2019 28.6 26.6 - 33.0 pg Final   03/26/2019 26.6 26.0 - 34.0 pg Final     MCHC   Date Value Ref Range Status   12/20/2019 34.3 31.5 - 35.7 g/dL Final   03/26/2019 31.2 31.0 - 37.0 g/dL Final     RDW   Date Value Ref Range Status   12/20/2019 14.0 12.3 - 15.4 % Final   03/26/2019 17.0 (H) 12.0 - 16.8 % Final     RDW-SD   Date Value Ref Range Status   12/20/2019 41.7 37.0 - 54.0 fl Final     MPV   Date Value Ref Range Status   12/20/2019 10.2 6.0 - 12.0 fL Final   03/26/2019 9.8 6.7 - 10.8 fL Final     Platelets   Date Value Ref Range Status   12/20/2019 235 140 - 450 10*3/mm3 Final   03/26/2019 231 140 - 440 10*3/uL Final     Neutrophil Rel %   Date Value Ref Range Status   03/26/2019 74.7 45 - 80 % Final     Neutrophil %   Date Value Ref Range Status   12/20/2019 72.9 42.7 - 76.0 % Final     Lymphocyte Rel %   Date Value Ref Range Status   03/26/2019 17.2 15 - 50 % Final     Lymphocyte %   Date Value Ref Range Status   12/20/2019 16.3 (L) 19.6 - 45.3 % Final     Monocyte Rel %   Date Value Ref Range Status   03/26/2019 5.8 0 - 15 % Final     Monocyte %   Date Value Ref Range Status   12/20/2019 9.1 5.0 - 12.0 % Final     Eosinophil %   Date Value Ref Range Status   12/20/2019 0.9 0.3 - 6.2 % Final   03/26/2019 2.0 0 - 7 % Final     Basophil Rel %   Date Value Ref Range Status   03/26/2019 0.2 0 - 2 % Final     Basophil %   Date Value Ref Range Status   12/20/2019 0.3 0.0 - 1.5 % Final     Immature Grans %   Date Value Ref Range Status   12/20/2019 0.5 0.0 - 0.5 % Final   03/26/2019 0.1 (H) 0 % Final     Neutrophils Absolute   Date Value Ref Range Status   03/26/2019 6.21 2.0 - 8.8 10*3/uL Final     Neutrophils, Absolute   Date Value Ref Range Status   12/20/2019 6.42 1.70 - 7.00 10*3/mm3 Final     Lymphocytes Absolute   Date Value Ref Range Status   03/26/2019  1.43 0.7 - 5.5 10*3/uL Final     Lymphocytes, Absolute   Date Value Ref Range Status   12/20/2019 1.43 0.70 - 3.10 10*3/mm3 Final     Monocytes Absolute   Date Value Ref Range Status   03/26/2019 0.48 0.0 - 1.7 10*3/uL Final     Monocytes, Absolute   Date Value Ref Range Status   12/20/2019 0.80 0.10 - 0.90 10*3/mm3 Final     Eosinophils Absolute   Date Value Ref Range Status   03/26/2019 0.17 0.0 - 0.8 10*3/uL Final     Eosinophils, Absolute   Date Value Ref Range Status   12/20/2019 0.08 0.00 - 0.40 10*3/mm3 Final     Basophils Absolute   Date Value Ref Range Status   03/26/2019 0.02 0.0 - 0.2 10*3/uL Final     Basophils, Absolute   Date Value Ref Range Status   12/20/2019 0.03 0.00 - 0.20 10*3/mm3 Final     Immature Grans, Absolute   Date Value Ref Range Status   12/20/2019 0.04 0.00 - 0.05 10*3/mm3 Final   03/26/2019 0.01 <1 10*3/uL Final     nRBC   Date Value Ref Range Status   12/20/2019 0.0 0.0 - 0.2 /100 WBC Final       No results found for: HGBA1C    Lab Results   Component Value Date    NAOSABKG10 864 (H) 07/22/2018       TSH   Date Value Ref Range Status   09/16/2019 0.716 0.450 - 4.500 uIU/mL Final       No results found for: CHOL  Lab Results   Component Value Date    TRIG 60 09/16/2019     Lab Results   Component Value Date    HDL 64 09/16/2019     Lab Results   Component Value Date     (H) 09/16/2019     Lab Results   Component Value Date    VLDL 12 09/16/2019     No results found for: LDLHDL      Procedures    Assessment/Plan   Problems Addressed this Visit        Digestive    Chronic idiopathic constipation       Nervous and Auditory    Generalized abdominal pain    Lower abdominal pain       Other    Unintentional weight loss - Primary      Uncontrolled.    No etiology at present.  Pt to get EGD and colonoscopy 1/9/20.  Start boost BID.    No orders of the defined types were placed in this encounter.      Current Outpatient Medications   Medication Sig Dispense Refill   • hydrochlorothiazide  (HYDRODIURIL) 25 MG tablet Take 1 tablet by mouth Daily. 90 tablet 3   • potassium chloride (K-DUR) 10 MEQ CR tablet Take 1 tablet by mouth Daily. 90 tablet 3   • ferrous sulfate 325 (65 FE) MG tablet Take 325 mg by mouth Daily With Breakfast.     • HYDROcodone-acetaminophen (NORCO) 7.5-325 MG per tablet Take 1 tablet by mouth Take As Directed.     • pantoprazole (PROTONIX) 40 MG EC tablet Take 1 tablet by mouth Daily. 30 tablet 4   • senna-docusate sodium (SENOKOT-S) 8.6-50 MG tablet Take 2 tablets by mouth Daily. For constipation 60 tablet 3   • tamsulosin (FLOMAX) 0.4 MG capsule 24 hr capsule Take 1 capsule by mouth Daily.     • triamcinolone (KENALOG) 0.1 % cream Apply  topically to the appropriate area as directed 2 (Two) Times a Day. 80 g 2     No current facility-administered medications for this visit.        Leander Mayes had no medications administered during this visit.    Return in about 1 month (around 1/30/2020).    There are no Patient Instructions on file for this visit.

## 2020-01-01 ENCOUNTER — OFFICE VISIT (OUTPATIENT)
Dept: FAMILY MEDICINE CLINIC | Facility: CLINIC | Age: 65
End: 2020-01-01

## 2020-01-01 ENCOUNTER — TELEPHONE (OUTPATIENT)
Dept: FAMILY MEDICINE CLINIC | Facility: CLINIC | Age: 65
End: 2020-01-01

## 2020-01-01 ENCOUNTER — TELEPHONE (OUTPATIENT)
Dept: GASTROENTEROLOGY | Facility: CLINIC | Age: 65
End: 2020-01-01

## 2020-01-01 ENCOUNTER — OUTSIDE FACILITY SERVICE (OUTPATIENT)
Dept: GASTROENTEROLOGY | Facility: CLINIC | Age: 65
End: 2020-01-01

## 2020-01-01 VITALS
WEIGHT: 160.4 LBS | SYSTOLIC BLOOD PRESSURE: 102 MMHG | HEART RATE: 61 BPM | HEIGHT: 73 IN | OXYGEN SATURATION: 96 % | TEMPERATURE: 97.4 F | DIASTOLIC BLOOD PRESSURE: 62 MMHG | BODY MASS INDEX: 21.26 KG/M2

## 2020-01-01 VITALS
BODY MASS INDEX: 22.35 KG/M2 | WEIGHT: 165 LBS | HEART RATE: 110 BPM | OXYGEN SATURATION: 98 % | TEMPERATURE: 96.6 F | HEIGHT: 72 IN | DIASTOLIC BLOOD PRESSURE: 60 MMHG | SYSTOLIC BLOOD PRESSURE: 124 MMHG

## 2020-01-01 DIAGNOSIS — R63.4 UNINTENTIONAL WEIGHT LOSS: ICD-10-CM

## 2020-01-01 DIAGNOSIS — I10 ESSENTIAL HYPERTENSION: ICD-10-CM

## 2020-01-01 DIAGNOSIS — R10.84 GENERALIZED ABDOMINAL PAIN: ICD-10-CM

## 2020-01-01 DIAGNOSIS — K29.60 OTHER GASTRITIS WITHOUT HEMORRHAGE, UNSPECIFIED CHRONICITY: Primary | ICD-10-CM

## 2020-01-01 DIAGNOSIS — M79.89 LEG SWELLING: Primary | ICD-10-CM

## 2020-01-01 DIAGNOSIS — R10.84 GENERALIZED ABDOMINAL PAIN: Primary | ICD-10-CM

## 2020-01-01 DIAGNOSIS — K40.90 NON-RECURRENT UNILATERAL INGUINAL HERNIA WITHOUT OBSTRUCTION OR GANGRENE: ICD-10-CM

## 2020-01-01 LAB
ALBUMIN SERPL-MCNC: 3.1 G/DL (ref 3.5–5.2)
ALBUMIN/GLOB SERPL: 1.3 G/DL
ALP SERPL-CCNC: 81 U/L (ref 39–117)
ALT SERPL-CCNC: 29 U/L (ref 1–41)
AST SERPL-CCNC: 17 U/L (ref 1–40)
BILIRUB SERPL-MCNC: 1.2 MG/DL (ref 0.2–1.2)
BUN SERPL-MCNC: 10 MG/DL (ref 8–23)
BUN/CREAT SERPL: 8.5 (ref 7–25)
CALCIUM SERPL-MCNC: 9.1 MG/DL (ref 8.6–10.5)
CHLORIDE SERPL-SCNC: 99 MMOL/L (ref 98–107)
CK SERPL-CCNC: 54 U/L (ref 20–200)
CO2 SERPL-SCNC: 30 MMOL/L (ref 22–29)
CREAT SERPL-MCNC: 1.17 MG/DL (ref 0.76–1.27)
ERYTHROCYTE [SEDIMENTATION RATE] IN BLOOD BY WESTERGREN METHOD: 1 MM/HR (ref 0–20)
GLOBULIN SER CALC-MCNC: 2.3 GM/DL
GLUCOSE SERPL-MCNC: 84 MG/DL (ref 65–99)
HBA1C MFR BLD: 6.4 % (ref 4.8–5.6)
POTASSIUM SERPL-SCNC: 3.8 MMOL/L (ref 3.5–5.2)
PROT SERPL-MCNC: 5.4 G/DL (ref 6–8.5)
SODIUM SERPL-SCNC: 141 MMOL/L (ref 136–145)

## 2020-01-01 PROCEDURE — 99214 OFFICE O/P EST MOD 30 MIN: CPT | Performed by: FAMILY MEDICINE

## 2020-01-01 PROCEDURE — 43239 EGD BIOPSY SINGLE/MULTIPLE: CPT | Performed by: INTERNAL MEDICINE

## 2020-01-01 PROCEDURE — 99442 PR PHYS/QHP TELEPHONE EVALUATION 11-20 MIN: CPT | Performed by: FAMILY MEDICINE

## 2020-01-01 PROCEDURE — 45378 DIAGNOSTIC COLONOSCOPY: CPT | Performed by: INTERNAL MEDICINE

## 2020-01-01 RX ORDER — POTASSIUM CHLORIDE 750 MG/1
10 TABLET, FILM COATED, EXTENDED RELEASE ORAL DAILY
Qty: 90 TABLET | Refills: 3
Start: 2020-01-01

## 2020-01-01 RX ORDER — PANTOPRAZOLE SODIUM 40 MG/1
TABLET, DELAYED RELEASE ORAL
Qty: 180 TABLET | Refills: 1 | Status: SHIPPED | OUTPATIENT
Start: 2020-01-01

## 2020-01-01 RX ORDER — DICYCLOMINE HYDROCHLORIDE 10 MG/1
10 CAPSULE ORAL 4 TIMES DAILY PRN
Qty: 120 CAPSULE | Refills: 2 | Status: SHIPPED | OUTPATIENT
Start: 2020-01-01 | End: 2020-01-01

## 2020-01-01 RX ORDER — FUROSEMIDE 20 MG/1
20 TABLET ORAL DAILY
Qty: 30 TABLET | Refills: 0 | Status: SHIPPED | OUTPATIENT
Start: 2020-01-01 | End: 2020-01-01 | Stop reason: SDUPTHER

## 2020-01-01 RX ORDER — FUROSEMIDE 20 MG/1
20 TABLET ORAL DAILY
Qty: 90 TABLET | Refills: 3 | Status: SHIPPED | OUTPATIENT
Start: 2020-01-01

## 2020-01-09 NOTE — TELEPHONE ENCOUNTER
----- Message from Adelso Mata MD sent at 1/9/2020  2:15 PM EST -----  Regarding: rx  Call in dicyclomine generic 10mg po qid PRN cramping #120 2 rf

## 2020-01-21 NOTE — PROGRESS NOTES
Office visit nurse practitioner 2 weeks, to discuss extra prep for colonoscopy and to reschedule his colonoscopy for next month  Pathology benign

## 2020-01-30 PROBLEM — K29.70 GASTRITIS: Status: ACTIVE | Noted: 2020-01-01

## 2020-01-30 PROBLEM — K40.90 NON-RECURRENT UNILATERAL INGUINAL HERNIA: Status: ACTIVE | Noted: 2020-01-01

## 2020-01-30 NOTE — PROGRESS NOTES
Chief Complaint   Patient presents with   • Weight Loss   • Hernia       Subjective   Leander FREEDMAN Young is a 64 y.o. male.     History of Present Illness   F/U unintentional wt loss with lower abdominal pain.  Decreased appetite.  EGD with active gastritis.  Pathology unrevealing.  Hpylori neg.  Colonoscopy with poor prep.  Repeat in one month per report.  CT chest/abd/pelvis unrevealing 9/19.  Labs unrevealing including TSH, CBC, CMP, PSA.  Down 12 lbs from one month ago.  I am doing one boost in AM.  Usually a bag of chips for lunch.  I will eat a piece of chicken fo rsupper and a piece of bread.     F/UJ HTN.  No orhtostasis.  Doing well with meds.    The following portions of the patient's history were reviewed and updated as appropriate: allergies, current medications, past family history, past medical history, past social history, past surgical history and problem list.    Review of Systems   Constitutional: Negative for appetite change and fatigue.   HENT: Negative for nosebleeds and sore throat.    Eyes: Negative for blurred vision and visual disturbance.   Respiratory: Negative for shortness of breath and wheezing.    Cardiovascular: Negative for chest pain and leg swelling.   Gastrointestinal: Negative for abdominal distention and abdominal pain.   Endocrine: Negative for cold intolerance and polyuria.   Genitourinary: Negative for dysuria and hematuria.   Musculoskeletal: Negative for arthralgias and myalgias.   Skin: Negative for color change and rash.   Neurological: Negative for weakness and confusion.   Psychiatric/Behavioral: Negative for agitation and depressed mood.       Patient Active Problem List   Diagnosis   • Venous stasis   • Stage 2 chronic kidney disease   • Spondylosis without myelopathy or radiculopathy, lumbar region   • Spinal stenosis, lumbosacral region   • Renal cyst, acquired   • Prostate cancer (CMS/HCC)   • Osteoarthritis of knees, bilateral   • Inability to attain erection   •  Hypokalemia   • Hypertension   • High risk medication use   • GERD without esophagitis   • Depression   • Degenerative disc disease, lumbar   • Anemia   • BPH (benign prostatic hyperplasia)   • History of total left hip arthroplasty   • Unintentional weight loss   • Chronic idiopathic constipation   • Generalized abdominal pain   • Umbilical hernia   • Rotator cuff tendonitis, right   • Right inguinal hernia   • Rib fracture   • Rash   • Postprocedural seroma of skin and subcutaneous tissue following other procedure   • Osteoarthritis   • Lumbago with sciatica   • Hip fracture (CMS/HCC)   • Hematochezia   • Arthritis of left hip   • Arthritis   • Lower abdominal pain   • Gastritis   • Non-recurrent unilateral inguinal hernia       No Known Allergies      Current Outpatient Medications:   •  dicyclomine (BENTYL) 10 MG capsule, Take 1 capsule by mouth 4 (Four) Times a Day As Needed (for cramping)., Disp: 120 capsule, Rfl: 2  •  ferrous sulfate 325 (65 FE) MG tablet, Take 325 mg by mouth Daily With Breakfast., Disp: , Rfl:   •  hydrochlorothiazide (HYDRODIURIL) 25 MG tablet, Take 1 tablet by mouth Daily., Disp: 90 tablet, Rfl: 3  •  HYDROcodone-acetaminophen (NORCO) 7.5-325 MG per tablet, Take 1 tablet by mouth Take As Directed., Disp: , Rfl:   •  pantoprazole (PROTONIX) 40 MG EC tablet, One BID, Disp: 180 tablet, Rfl: 1  •  potassium chloride (K-DUR) 10 MEQ CR tablet, Take 1 tablet by mouth Daily., Disp: 90 tablet, Rfl: 3  •  senna-docusate sodium (SENOKOT-S) 8.6-50 MG tablet, Take 2 tablets by mouth Daily. For constipation, Disp: 60 tablet, Rfl: 3  •  tamsulosin (FLOMAX) 0.4 MG capsule 24 hr capsule, Take 1 capsule by mouth Daily., Disp: , Rfl:   •  triamcinolone (KENALOG) 0.1 % cream, Apply  topically to the appropriate area as directed 2 (Two) Times a Day., Disp: 80 g, Rfl: 2    Past Medical History:   Diagnosis Date   • Anemia    • Arthritis    • Arthritis of left hip    • BPH (benign prostatic hyperplasia)    •  Degenerative disc disease, lumbar    • Depression    • GERD without esophagitis    • Hematochezia    • High risk medication use    • Hip fracture (CMS/HCC)    • Hypertension    • Hypokalemia    • Inability to attain erection    • Lumbago with sciatica    • Osteoarthritis    • Osteoarthritis of knees, bilateral    • Postprocedural seroma of skin and subcutaneous tissue following other procedure    • Prostate cancer (CMS/HCC)    • Rash    • Renal cyst, acquired    • Rib fracture    • Right inguinal hernia    • Rotator cuff tendonitis, right    • Spinal stenosis, lumbosacral region    • Spondylosis without myelopathy or radiculopathy, lumbar region    • Stage 2 chronic kidney disease    • Umbilical hernia    • Venous stasis        Past Surgical History:   Procedure Laterality Date   • COLONOSCOPY  12/2016    normal   • INGUINAL HERNIA REPAIR Right    • KNEE SURGERY     • SUPRAPUBIC PROSTATECTOMY     • TOTAL HIP ARTHROPLASTY Bilateral        Family History   Problem Relation Age of Onset   • Diabetes type I Mother    • Hypertension Mother    • Alcohol abuse Father    • Arthritis Maternal Grandmother    • Arthritis Paternal Grandmother    • No Known Problems Other        Social History     Tobacco Use   • Smoking status: Never Smoker   • Smokeless tobacco: Never Used   Substance Use Topics   • Alcohol use: Yes     Comment: Occasionally-drinks alcohol, 7 or less drinks per week            Objective     Vitals:    01/30/20 1420   BP: 124/60   Pulse: 110   Temp: 96.6 °F (35.9 °C)   SpO2: 98%     Body mass index is 22.37 kg/m².    Physical Exam   Constitutional: He appears well-developed and well-nourished.   HENT:   Head: Normocephalic and atraumatic.   Mouth/Throat: Oropharynx is clear and moist.   Eyes: Pupils are equal, round, and reactive to light. No scleral icterus.   Neck: No thyromegaly present.   Cardiovascular: Normal rate and regular rhythm. Exam reveals no gallop and no friction rub.   No murmur  heard.  Pulmonary/Chest: Effort normal. No respiratory distress. He has no wheezes. He has no rales. He exhibits no tenderness.   Abdominal: Soft. Bowel sounds are normal. He exhibits no distension. There is no tenderness.   L inguinal hernia  Direct reducible.     Musculoskeletal: Normal range of motion. He exhibits no edema or deformity.   Lymphadenopathy:     He has no cervical adenopathy.   Neurological: No cranial nerve deficit. He exhibits normal muscle tone.   Skin: Skin is warm and dry. No rash noted. He is not diaphoretic.   Vitals reviewed.      Lab Results   Component Value Date    GLUCOSE 106 (H) 12/20/2019    BUN 16 12/20/2019    CREATININE 1.46 (H) 12/20/2019    EGFRIFNONA 57 (L) 09/16/2019    EGFRIFAFRI 59 (L) 12/20/2019    BCR 11.0 12/20/2019    K 3.6 12/20/2019    CO2 28.7 12/20/2019    CALCIUM 9.7 12/20/2019    PROTENTOTREF 7.7 09/16/2019    ALBUMIN 4.10 12/20/2019    LABIL2 1.5 09/16/2019    AST 15 12/20/2019    ALT 17 12/20/2019       WBC   Date Value Ref Range Status   12/20/2019 8.80 3.40 - 10.80 10*3/mm3 Final   09/16/2019 8.8 3.4 - 10.8 x10E3/uL Final   03/26/2019 8.32 4.5 - 11.0 10*3/uL Final     RBC   Date Value Ref Range Status   12/20/2019 5.14 4.14 - 5.80 10*6/mm3 Final   09/16/2019 5.27 4.14 - 5.80 x10E6/uL Final   03/26/2019 5.19 4.5 - 5.9 10*6/uL Final     Hemoglobin   Date Value Ref Range Status   12/20/2019 14.7 13.0 - 17.7 g/dL Final   04/22/2019 13.8 13.5 - 17.5 g/dL Final     Hematocrit   Date Value Ref Range Status   12/20/2019 42.8 37.5 - 51.0 % Final   04/22/2019 42.3 41.0 - 53.0 % Final     MCV   Date Value Ref Range Status   12/20/2019 83.3 79.0 - 97.0 fL Final   03/26/2019 85.2 80.0 - 100.0 fL Final     MCH   Date Value Ref Range Status   12/20/2019 28.6 26.6 - 33.0 pg Final   03/26/2019 26.6 26.0 - 34.0 pg Final     MCHC   Date Value Ref Range Status   12/20/2019 34.3 31.5 - 35.7 g/dL Final   03/26/2019 31.2 31.0 - 37.0 g/dL Final     RDW   Date Value Ref Range Status    12/20/2019 14.0 12.3 - 15.4 % Final   03/26/2019 17.0 (H) 12.0 - 16.8 % Final     RDW-SD   Date Value Ref Range Status   12/20/2019 41.7 37.0 - 54.0 fl Final     MPV   Date Value Ref Range Status   12/20/2019 10.2 6.0 - 12.0 fL Final   03/26/2019 9.8 6.7 - 10.8 fL Final     Platelets   Date Value Ref Range Status   12/20/2019 235 140 - 450 10*3/mm3 Final   03/26/2019 231 140 - 440 10*3/uL Final     Neutrophil Rel %   Date Value Ref Range Status   03/26/2019 74.7 45 - 80 % Final     Neutrophil %   Date Value Ref Range Status   12/20/2019 72.9 42.7 - 76.0 % Final     Lymphocyte Rel %   Date Value Ref Range Status   03/26/2019 17.2 15 - 50 % Final     Lymphocyte %   Date Value Ref Range Status   12/20/2019 16.3 (L) 19.6 - 45.3 % Final     Monocyte Rel %   Date Value Ref Range Status   03/26/2019 5.8 0 - 15 % Final     Monocyte %   Date Value Ref Range Status   12/20/2019 9.1 5.0 - 12.0 % Final     Eosinophil %   Date Value Ref Range Status   12/20/2019 0.9 0.3 - 6.2 % Final   03/26/2019 2.0 0 - 7 % Final     Basophil Rel %   Date Value Ref Range Status   03/26/2019 0.2 0 - 2 % Final     Basophil %   Date Value Ref Range Status   12/20/2019 0.3 0.0 - 1.5 % Final     Immature Grans %   Date Value Ref Range Status   12/20/2019 0.5 0.0 - 0.5 % Final   03/26/2019 0.1 (H) 0 % Final     Neutrophils Absolute   Date Value Ref Range Status   03/26/2019 6.21 2.0 - 8.8 10*3/uL Final     Neutrophils, Absolute   Date Value Ref Range Status   12/20/2019 6.42 1.70 - 7.00 10*3/mm3 Final     Lymphocytes Absolute   Date Value Ref Range Status   03/26/2019 1.43 0.7 - 5.5 10*3/uL Final     Lymphocytes, Absolute   Date Value Ref Range Status   12/20/2019 1.43 0.70 - 3.10 10*3/mm3 Final     Monocytes Absolute   Date Value Ref Range Status   03/26/2019 0.48 0.0 - 1.7 10*3/uL Final     Monocytes, Absolute   Date Value Ref Range Status   12/20/2019 0.80 0.10 - 0.90 10*3/mm3 Final     Eosinophils Absolute   Date Value Ref Range Status    03/26/2019 0.17 0.0 - 0.8 10*3/uL Final     Eosinophils, Absolute   Date Value Ref Range Status   12/20/2019 0.08 0.00 - 0.40 10*3/mm3 Final     Basophils Absolute   Date Value Ref Range Status   03/26/2019 0.02 0.0 - 0.2 10*3/uL Final     Basophils, Absolute   Date Value Ref Range Status   12/20/2019 0.03 0.00 - 0.20 10*3/mm3 Final     Immature Grans, Absolute   Date Value Ref Range Status   12/20/2019 0.04 0.00 - 0.05 10*3/mm3 Final   03/26/2019 0.01 <1 10*3/uL Final     nRBC   Date Value Ref Range Status   12/20/2019 0.0 0.0 - 0.2 /100 WBC Final       No results found for: HGBA1C    Lab Results   Component Value Date    PIZWDLDE69 864 (H) 07/22/2018       TSH   Date Value Ref Range Status   09/16/2019 0.716 0.450 - 4.500 uIU/mL Final       No results found for: CHOL  Lab Results   Component Value Date    TRIG 60 09/16/2019     Lab Results   Component Value Date    HDL 64 09/16/2019     Lab Results   Component Value Date     (H) 09/16/2019     Lab Results   Component Value Date    VLDL 12 09/16/2019     No results found for: LDLHDL      Procedures    Assessment/Plan   Problems Addressed this Visit        Cardiovascular and Mediastinum    Hypertension       Digestive    Gastritis - Primary    Relevant Medications    pantoprazole (PROTONIX) 40 MG EC tablet       Nervous and Auditory    Generalized abdominal pain    Relevant Medications    pantoprazole (PROTONIX) 40 MG EC tablet       Other    Unintentional weight loss    Non-recurrent unilateral inguinal hernia        Encouraged pt to increase calorie intake.(as only getting about 1200 calories a day form diet history).  Double up PPI.  Continue BP meds.  Pt desires to hold on inguinal hernia repair until wife is placed in Nursing home.  ER warnings given.       No orders of the defined types were placed in this encounter.      Current Outpatient Medications   Medication Sig Dispense Refill   • dicyclomine (BENTYL) 10 MG capsule Take 1 capsule by mouth 4  (Four) Times a Day As Needed (for cramping). 120 capsule 2   • ferrous sulfate 325 (65 FE) MG tablet Take 325 mg by mouth Daily With Breakfast.     • hydrochlorothiazide (HYDRODIURIL) 25 MG tablet Take 1 tablet by mouth Daily. 90 tablet 3   • HYDROcodone-acetaminophen (NORCO) 7.5-325 MG per tablet Take 1 tablet by mouth Take As Directed.     • pantoprazole (PROTONIX) 40 MG EC tablet One  tablet 1   • potassium chloride (K-DUR) 10 MEQ CR tablet Take 1 tablet by mouth Daily. 90 tablet 3   • senna-docusate sodium (SENOKOT-S) 8.6-50 MG tablet Take 2 tablets by mouth Daily. For constipation 60 tablet 3   • tamsulosin (FLOMAX) 0.4 MG capsule 24 hr capsule Take 1 capsule by mouth Daily.     • triamcinolone (KENALOG) 0.1 % cream Apply  topically to the appropriate area as directed 2 (Two) Times a Day. 80 g 2     No current facility-administered medications for this visit.        Leander Mayes had no medications administered during this visit.    Return in about 2 months (around 3/30/2020).    There are no Patient Instructions on file for this visit.

## 2020-02-06 NOTE — TELEPHONE ENCOUNTER
----- Message from Adelso Mata MD sent at 1/21/2020  1:01 PM EST -----  Office visit nurse practitioner 2 weeks, to discuss extra prep for colonoscopy and to reschedule his colonoscopy for next month  Pathology benign

## 2020-02-06 NOTE — TELEPHONE ENCOUNTER
Called pt and advised of the note from Dr Mata.he verb understanding and states he is in the process of getting his wife placed in a nursing home. Once he gets her settled, he will call back and arrange an office visit for himself.

## 2020-03-18 NOTE — PROGRESS NOTES
"Chief Complaint   Patient presents with   • Weight Loss       Subjective   Leander FREEDMAN Young is a 64 y.o. male.     History of Present Illness   F/U unintentional wt loss.  Wt 267 looking back at records from 5/14/18.  Has had L hip replacement with post of bleeding from 7/18.  Also with R hip replacement as well.  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  CT Chest /Abd/pelvis unrevealing 9/19.  Recently from 12/19 with 20 lb weight loss.  Wt today 160 lbs.  C scope 12/16 normal.  EGD with acute gastritis 1/20.  C scope 1/20 with poor prep.  My stomach still gives me pain.  Not worse with eating.  No HA.  BMs moving well.   Down 5 lbs from 6 weeks ago.  Started on pantoprazole 40 BID.  \" I have a hard time getting away to take care of my self and making appts due to care for wife. \"   Labs reviewed Cr 1.46 2 months ago.  CBC normal 2 months ago.  PSA less than 0.1 (hx of prostatectomy due to prostate ca). 6 months ago. Hep C normal.  TSH normal.    F?U HTN.  No orhtostasis.    Yesterday diet:  Breakfast:Orange juice, coffee,  Blueberry donut,  Slice of varela.  Lunch: nothing  Supper:  1/2 plate of spaghetti with a big red and slice of bread.    Ginger snap cookies(4) with ginger ale.       The following portions of the patient's history were reviewed and updated as appropriate: allergies, current medications, past family history, past medical history, past social history, past surgical history and problem list.    Review of Systems   Constitutional: Negative for appetite change and fatigue.   HENT: Negative for nosebleeds and sore throat.    Eyes: Negative for blurred vision and visual disturbance.   Respiratory: Negative for shortness of breath and wheezing.    Cardiovascular: Negative for chest pain and leg swelling.   Gastrointestinal: Negative for abdominal distention and abdominal pain.   Endocrine: Negative for cold intolerance and polyuria.   Genitourinary: Negative for dysuria and " hematuria.   Musculoskeletal: Negative for arthralgias and myalgias.   Skin: Negative for color change and rash.   Neurological: Positive for weakness. Negative for confusion.   Psychiatric/Behavioral: Negative for agitation and depressed mood.       Patient Active Problem List   Diagnosis   • Venous stasis   • Stage 2 chronic kidney disease   • Spondylosis without myelopathy or radiculopathy, lumbar region   • Spinal stenosis, lumbosacral region   • Renal cyst, acquired   • Prostate cancer (CMS/HCC)   • Osteoarthritis of knees, bilateral   • Inability to attain erection   • Hypokalemia   • Hypertension   • High risk medication use   • GERD without esophagitis   • Depression   • Degenerative disc disease, lumbar   • Anemia   • BPH (benign prostatic hyperplasia)   • History of total left hip arthroplasty   • Unintentional weight loss   • Chronic idiopathic constipation   • Generalized abdominal pain   • Umbilical hernia   • Rotator cuff tendonitis, right   • Right inguinal hernia   • Rib fracture   • Rash   • Postprocedural seroma of skin and subcutaneous tissue following other procedure   • Osteoarthritis   • Lumbago with sciatica   • Hip fracture (CMS/HCC)   • Hematochezia   • Arthritis of left hip   • Arthritis   • Lower abdominal pain   • Gastritis   • Non-recurrent unilateral inguinal hernia       No Known Allergies      Current Outpatient Medications:   •  pantoprazole (PROTONIX) 40 MG EC tablet, One BID, Disp: 180 tablet, Rfl: 1  •  triamcinolone (KENALOG) 0.1 % cream, Apply  topically to the appropriate area as directed 2 (Two) Times a Day., Disp: 80 g, Rfl: 2  •  senna-docusate sodium (SENOKOT-S) 8.6-50 MG tablet, Take 2 tablets by mouth Daily. For constipation, Disp: 60 tablet, Rfl: 3    Past Medical History:   Diagnosis Date   • Anemia    • Arthritis    • Arthritis of left hip    • BPH (benign prostatic hyperplasia)    • Degenerative disc disease, lumbar    • Depression    • GERD without esophagitis    •  Hematochezia    • High risk medication use    • Hip fracture (CMS/HCC)    • Hypertension    • Hypokalemia    • Inability to attain erection    • Lumbago with sciatica    • Osteoarthritis    • Osteoarthritis of knees, bilateral    • Postprocedural seroma of skin and subcutaneous tissue following other procedure    • Prostate cancer (CMS/HCC)    • Rash    • Renal cyst, acquired    • Rib fracture    • Right inguinal hernia    • Rotator cuff tendonitis, right    • Spinal stenosis, lumbosacral region    • Spondylosis without myelopathy or radiculopathy, lumbar region    • Stage 2 chronic kidney disease    • Umbilical hernia    • Venous stasis        Past Surgical History:   Procedure Laterality Date   • COLONOSCOPY  12/2016    normal   • INGUINAL HERNIA REPAIR Right    • KNEE SURGERY     • SUPRAPUBIC PROSTATECTOMY     • TOTAL HIP ARTHROPLASTY Bilateral        Family History   Problem Relation Age of Onset   • Diabetes type I Mother    • Hypertension Mother    • Alcohol abuse Father    • Arthritis Maternal Grandmother    • Arthritis Paternal Grandmother    • No Known Problems Other        Social History     Tobacco Use   • Smoking status: Never Smoker   • Smokeless tobacco: Never Used   Substance Use Topics   • Alcohol use: Yes     Comment: Occasionally-drinks alcohol, 7 or less drinks per week            Objective     Vitals:    03/18/20 1436   BP:    Pulse:    Temp:    SpO2: 96%     Body mass index is 21.16 kg/m².    Physical Exam   Constitutional: He appears well-developed and well-nourished.   HENT:   Head: Normocephalic and atraumatic.   Mouth/Throat: Oropharynx is clear and moist.   Eyes: Pupils are equal, round, and reactive to light. No scleral icterus.   Neck: No thyromegaly present.   Cardiovascular: Normal rate and regular rhythm. Exam reveals no gallop and no friction rub.   No murmur heard.  Pulmonary/Chest: Effort normal. No respiratory distress. He has no wheezes. He has no rales. He exhibits no tenderness.    Abdominal: Soft. Bowel sounds are normal. He exhibits no distension. There is no tenderness.   Musculoskeletal: Normal range of motion. He exhibits no edema or deformity.   Lymphadenopathy:     He has no cervical adenopathy.   Neurological: No cranial nerve deficit. He exhibits normal muscle tone.   Skin: Skin is warm and dry. No rash noted. He is not diaphoretic.   Vitals reviewed.      Lab Results   Component Value Date    GLUCOSE 106 (H) 12/20/2019    BUN 16 12/20/2019    CREATININE 1.46 (H) 12/20/2019    EGFRIFNONA 57 (L) 09/16/2019    EGFRIFAFRI 59 (L) 12/20/2019    BCR 11.0 12/20/2019    K 3.6 12/20/2019    CO2 28.7 12/20/2019    CALCIUM 9.7 12/20/2019    PROTENTOTREF 7.7 09/16/2019    ALBUMIN 4.10 12/20/2019    LABIL2 1.5 09/16/2019    AST 15 12/20/2019    ALT 17 12/20/2019       WBC   Date Value Ref Range Status   12/20/2019 8.80 3.40 - 10.80 10*3/mm3 Final   09/16/2019 8.8 3.4 - 10.8 x10E3/uL Final   03/26/2019 8.32 4.5 - 11.0 10*3/uL Final     RBC   Date Value Ref Range Status   12/20/2019 5.14 4.14 - 5.80 10*6/mm3 Final   09/16/2019 5.27 4.14 - 5.80 x10E6/uL Final   03/26/2019 5.19 4.5 - 5.9 10*6/uL Final     Hemoglobin   Date Value Ref Range Status   12/20/2019 14.7 13.0 - 17.7 g/dL Final   04/22/2019 13.8 13.5 - 17.5 g/dL Final     Hematocrit   Date Value Ref Range Status   12/20/2019 42.8 37.5 - 51.0 % Final   04/22/2019 42.3 41.0 - 53.0 % Final     MCV   Date Value Ref Range Status   12/20/2019 83.3 79.0 - 97.0 fL Final   03/26/2019 85.2 80.0 - 100.0 fL Final     MCH   Date Value Ref Range Status   12/20/2019 28.6 26.6 - 33.0 pg Final   03/26/2019 26.6 26.0 - 34.0 pg Final     MCHC   Date Value Ref Range Status   12/20/2019 34.3 31.5 - 35.7 g/dL Final   03/26/2019 31.2 31.0 - 37.0 g/dL Final     RDW   Date Value Ref Range Status   12/20/2019 14.0 12.3 - 15.4 % Final   03/26/2019 17.0 (H) 12.0 - 16.8 % Final     RDW-SD   Date Value Ref Range Status   12/20/2019 41.7 37.0 - 54.0 fl Final     MPV    Date Value Ref Range Status   12/20/2019 10.2 6.0 - 12.0 fL Final   03/26/2019 9.8 6.7 - 10.8 fL Final     Platelets   Date Value Ref Range Status   12/20/2019 235 140 - 450 10*3/mm3 Final   03/26/2019 231 140 - 440 10*3/uL Final     Neutrophil Rel %   Date Value Ref Range Status   03/26/2019 74.7 45 - 80 % Final     Neutrophil %   Date Value Ref Range Status   12/20/2019 72.9 42.7 - 76.0 % Final     Lymphocyte Rel %   Date Value Ref Range Status   03/26/2019 17.2 15 - 50 % Final     Lymphocyte %   Date Value Ref Range Status   12/20/2019 16.3 (L) 19.6 - 45.3 % Final     Monocyte Rel %   Date Value Ref Range Status   03/26/2019 5.8 0 - 15 % Final     Monocyte %   Date Value Ref Range Status   12/20/2019 9.1 5.0 - 12.0 % Final     Eosinophil %   Date Value Ref Range Status   12/20/2019 0.9 0.3 - 6.2 % Final   03/26/2019 2.0 0 - 7 % Final     Basophil Rel %   Date Value Ref Range Status   03/26/2019 0.2 0 - 2 % Final     Basophil %   Date Value Ref Range Status   12/20/2019 0.3 0.0 - 1.5 % Final     Immature Grans %   Date Value Ref Range Status   12/20/2019 0.5 0.0 - 0.5 % Final   03/26/2019 0.1 (H) 0 % Final     Neutrophils Absolute   Date Value Ref Range Status   03/26/2019 6.21 2.0 - 8.8 10*3/uL Final     Neutrophils, Absolute   Date Value Ref Range Status   12/20/2019 6.42 1.70 - 7.00 10*3/mm3 Final     Lymphocytes Absolute   Date Value Ref Range Status   03/26/2019 1.43 0.7 - 5.5 10*3/uL Final     Lymphocytes, Absolute   Date Value Ref Range Status   12/20/2019 1.43 0.70 - 3.10 10*3/mm3 Final     Monocytes Absolute   Date Value Ref Range Status   03/26/2019 0.48 0.0 - 1.7 10*3/uL Final     Monocytes, Absolute   Date Value Ref Range Status   12/20/2019 0.80 0.10 - 0.90 10*3/mm3 Final     Eosinophils Absolute   Date Value Ref Range Status   03/26/2019 0.17 0.0 - 0.8 10*3/uL Final     Eosinophils, Absolute   Date Value Ref Range Status   12/20/2019 0.08 0.00 - 0.40 10*3/mm3 Final     Basophils Absolute   Date  Value Ref Range Status   03/26/2019 0.02 0.0 - 0.2 10*3/uL Final     Basophils, Absolute   Date Value Ref Range Status   12/20/2019 0.03 0.00 - 0.20 10*3/mm3 Final     Immature Grans, Absolute   Date Value Ref Range Status   12/20/2019 0.04 0.00 - 0.05 10*3/mm3 Final   03/26/2019 0.01 <1 10*3/uL Final     nRBC   Date Value Ref Range Status   12/20/2019 0.0 0.0 - 0.2 /100 WBC Final       No results found for: HGBA1C    Lab Results   Component Value Date    [id number] 864 (H) 07/22/2018       TSH   Date Value Ref Range Status   09/16/2019 0.716 0.450 - 4.500 uIU/mL Final       No results found for: CHOL  Lab Results   Component Value Date    TRIG 60 09/16/2019     Lab Results   Component Value Date    HDL 64 09/16/2019     Lab Results   Component Value Date     (H) 09/16/2019     Lab Results   Component Value Date    VLDL 12 09/16/2019     No results found for: LDLHDL      Procedures    Assessment/Plan   Problems Addressed this Visit        Cardiovascular and Mediastinum    Hypertension       Nervous and Auditory    Generalized abdominal pain - Primary    Relevant Orders    CT Abdomen Pelvis With Contrast       Other    Unintentional weight loss    Relevant Orders    Sedimentation rate, automated    CK      Stop hctz and potassium.   Questionable etiology of wt loss.     Add nutritional supplement one a day.  Encouraged pt to see GI again(pt states difficult with home situation).      Orders Placed This Encounter   Procedures   • CT Abdomen Pelvis With Contrast     Standing Status:   Future     Standing Expiration Date:   3/18/2021     Order Specific Question:   Will Oral Contrast be needed for this procedure?     Answer:   Yes   • Sedimentation rate, automated     Standing Status:   Future     Standing Expiration Date:   3/18/2021   • CK     Standing Status:   Future     Standing Expiration Date:   3/18/2021       Current Outpatient Medications   Medication Sig Dispense Refill   • pantoprazole (PROTONIX) 40 MG  EC tablet One  tablet 1   • triamcinolone (KENALOG) 0.1 % cream Apply  topically to the appropriate area as directed 2 (Two) Times a Day. 80 g 2   • senna-docusate sodium (SENOKOT-S) 8.6-50 MG tablet Take 2 tablets by mouth Daily. For constipation 60 tablet 3     No current facility-administered medications for this visit.        Leander Mayes had no medications administered during this visit.    Return in about 1 month (around 4/18/2020).    There are no Patient Instructions on file for this visit.

## 2020-04-20 PROBLEM — M79.89 LEG SWELLING: Status: ACTIVE | Noted: 2020-01-01

## 2020-04-20 NOTE — PROGRESS NOTES
"Feet and legs swelling.    Subjective   Leander Mayes is a 64 y.o. male.     History of Present Illness   Telephone visit of 11 minutes.  C/o feet and legs swelling for last 2 weeks.  No SOA or orthopnea.  Has been battling with unintentional wt loss with poor nutrition.  Pt states he is trying to eat more.  No recent weight as \"I cannot find my scale yet\".      The following portions of the patient's history were reviewed and updated as appropriate: allergies, current medications, past family history, past medical history, past social history, past surgical history and problem list.    Review of Systems   Constitutional: Negative for appetite change and fatigue.   HENT: Negative for nosebleeds and sore throat.    Eyes: Negative for blurred vision and visual disturbance.   Respiratory: Negative for shortness of breath and wheezing.    Cardiovascular: Positive for leg swelling. Negative for chest pain.   Gastrointestinal: Negative for abdominal distention and abdominal pain.   Endocrine: Negative for cold intolerance and polyuria.   Genitourinary: Negative for dysuria and hematuria.   Musculoskeletal: Negative for arthralgias and myalgias.   Skin: Negative for color change and rash.   Neurological: Negative for weakness and confusion.   Psychiatric/Behavioral: Negative for agitation and depressed mood.       Patient Active Problem List   Diagnosis   • Venous stasis   • Stage 2 chronic kidney disease   • Spondylosis without myelopathy or radiculopathy, lumbar region   • Spinal stenosis, lumbosacral region   • Renal cyst, acquired   • Prostate cancer (CMS/HCC)   • Osteoarthritis of knees, bilateral   • Inability to attain erection   • Hypokalemia   • Hypertension   • High risk medication use   • GERD without esophagitis   • Depression   • Degenerative disc disease, lumbar   • Anemia   • BPH (benign prostatic hyperplasia)   • History of total left hip arthroplasty   • Unintentional weight loss   • Chronic idiopathic " constipation   • Generalized abdominal pain   • Umbilical hernia   • Rotator cuff tendonitis, right   • Right inguinal hernia   • Rib fracture   • Rash   • Postprocedural seroma of skin and subcutaneous tissue following other procedure   • Osteoarthritis   • Lumbago with sciatica   • Hip fracture (CMS/HCC)   • Hematochezia   • Arthritis of left hip   • Arthritis   • Lower abdominal pain   • Gastritis   • Non-recurrent unilateral inguinal hernia   • Leg swelling       No Known Allergies      Current Outpatient Medications:   •  furosemide (LASIX) 20 MG tablet, Take 1 tablet by mouth Daily., Disp: 90 tablet, Rfl: 3  •  pantoprazole (PROTONIX) 40 MG EC tablet, One BID, Disp: 180 tablet, Rfl: 1  •  potassium chloride (K-DUR) 10 MEQ CR tablet, Take 1 tablet by mouth Daily., Disp: 90 tablet, Rfl: 3  •  senna-docusate sodium (SENOKOT-S) 8.6-50 MG tablet, Take 2 tablets by mouth Daily. For constipation, Disp: 60 tablet, Rfl: 3  •  triamcinolone (KENALOG) 0.1 % cream, Apply  topically to the appropriate area as directed 2 (Two) Times a Day., Disp: 80 g, Rfl: 2    Past Medical History:   Diagnosis Date   • Anemia    • Arthritis    • Arthritis of left hip    • BPH (benign prostatic hyperplasia)    • Degenerative disc disease, lumbar    • Depression    • GERD without esophagitis    • Hematochezia    • High risk medication use    • Hip fracture (CMS/HCC)    • Hypertension    • Hypokalemia    • Inability to attain erection    • Lumbago with sciatica    • Osteoarthritis    • Osteoarthritis of knees, bilateral    • Postprocedural seroma of skin and subcutaneous tissue following other procedure    • Prostate cancer (CMS/HCC)    • Rash    • Renal cyst, acquired    • Rib fracture    • Right inguinal hernia    • Rotator cuff tendonitis, right    • Spinal stenosis, lumbosacral region    • Spondylosis without myelopathy or radiculopathy, lumbar region    • Stage 2 chronic kidney disease    • Umbilical hernia    • Venous stasis         Past Surgical History:   Procedure Laterality Date   • COLONOSCOPY  12/2016    normal   • INGUINAL HERNIA REPAIR Right    • KNEE SURGERY     • SUPRAPUBIC PROSTATECTOMY     • TOTAL HIP ARTHROPLASTY Bilateral        Family History   Problem Relation Age of Onset   • Diabetes type I Mother    • Hypertension Mother    • Alcohol abuse Father    • Arthritis Maternal Grandmother    • Arthritis Paternal Grandmother    • No Known Problems Other        Social History     Tobacco Use   • Smoking status: Never Smoker   • Smokeless tobacco: Never Used   Substance Use Topics   • Alcohol use: Yes     Comment: Occasionally-drinks alcohol, 7 or less drinks per week            Objective     There were no vitals filed for this visit.  There is no height or weight on file to calculate BMI.    Physical Exam   Constitutional: He is oriented to person, place, and time.   Pulmonary/Chest: Breath sounds normal.   Neurological: He is alert and oriented to person, place, and time.   Psychiatric: He has a normal mood and affect. Judgment and thought content normal.       Lab Results   Component Value Date    GLUCOSE 106 (H) 12/20/2019    BUN 10 03/18/2020    CREATININE 1.17 03/18/2020    EGFRIFNONA 63 03/18/2020    EGFRIFAFRI 76 03/18/2020    BCR 8.5 03/18/2020    K 3.8 03/18/2020    CO2 30.0 (H) 03/18/2020    CALCIUM 9.1 03/18/2020    PROTENTOTREF 5.4 (L) 03/18/2020    ALBUMIN 3.10 (L) 03/18/2020    LABIL2 1.3 03/18/2020    AST 17 03/18/2020    ALT 29 03/18/2020       WBC   Date Value Ref Range Status   12/20/2019 8.80 3.40 - 10.80 10*3/mm3 Final   09/16/2019 8.8 3.4 - 10.8 x10E3/uL Final   03/26/2019 8.32 4.5 - 11.0 10*3/uL Final     RBC   Date Value Ref Range Status   12/20/2019 5.14 4.14 - 5.80 10*6/mm3 Final   09/16/2019 5.27 4.14 - 5.80 x10E6/uL Final   03/26/2019 5.19 4.5 - 5.9 10*6/uL Final     Hemoglobin   Date Value Ref Range Status   12/20/2019 14.7 13.0 - 17.7 g/dL Final   04/22/2019 13.8 13.5 - 17.5 g/dL Final     Hematocrit    Date Value Ref Range Status   12/20/2019 42.8 37.5 - 51.0 % Final   04/22/2019 42.3 41.0 - 53.0 % Final     MCV   Date Value Ref Range Status   12/20/2019 83.3 79.0 - 97.0 fL Final   03/26/2019 85.2 80.0 - 100.0 fL Final     MCH   Date Value Ref Range Status   12/20/2019 28.6 26.6 - 33.0 pg Final   03/26/2019 26.6 26.0 - 34.0 pg Final     MCHC   Date Value Ref Range Status   12/20/2019 34.3 31.5 - 35.7 g/dL Final   03/26/2019 31.2 31.0 - 37.0 g/dL Final     RDW   Date Value Ref Range Status   12/20/2019 14.0 12.3 - 15.4 % Final   03/26/2019 17.0 (H) 12.0 - 16.8 % Final     RDW-SD   Date Value Ref Range Status   12/20/2019 41.7 37.0 - 54.0 fl Final     MPV   Date Value Ref Range Status   12/20/2019 10.2 6.0 - 12.0 fL Final   03/26/2019 9.8 6.7 - 10.8 fL Final     Platelets   Date Value Ref Range Status   12/20/2019 235 140 - 450 10*3/mm3 Final   03/26/2019 231 140 - 440 10*3/uL Final     Neutrophil Rel %   Date Value Ref Range Status   03/26/2019 74.7 45 - 80 % Final     Neutrophil %   Date Value Ref Range Status   12/20/2019 72.9 42.7 - 76.0 % Final     Lymphocyte Rel %   Date Value Ref Range Status   03/26/2019 17.2 15 - 50 % Final     Lymphocyte %   Date Value Ref Range Status   12/20/2019 16.3 (L) 19.6 - 45.3 % Final     Monocyte Rel %   Date Value Ref Range Status   03/26/2019 5.8 0 - 15 % Final     Monocyte %   Date Value Ref Range Status   12/20/2019 9.1 5.0 - 12.0 % Final     Eosinophil %   Date Value Ref Range Status   12/20/2019 0.9 0.3 - 6.2 % Final   03/26/2019 2.0 0 - 7 % Final     Basophil Rel %   Date Value Ref Range Status   03/26/2019 0.2 0 - 2 % Final     Basophil %   Date Value Ref Range Status   12/20/2019 0.3 0.0 - 1.5 % Final     Immature Grans %   Date Value Ref Range Status   12/20/2019 0.5 0.0 - 0.5 % Final   03/26/2019 0.1 (H) 0 % Final     Neutrophils Absolute   Date Value Ref Range Status   03/26/2019 6.21 2.0 - 8.8 10*3/uL Final     Neutrophils, Absolute   Date Value Ref Range Status    12/20/2019 6.42 1.70 - 7.00 10*3/mm3 Final     Lymphocytes Absolute   Date Value Ref Range Status   03/26/2019 1.43 0.7 - 5.5 10*3/uL Final     Lymphocytes, Absolute   Date Value Ref Range Status   12/20/2019 1.43 0.70 - 3.10 10*3/mm3 Final     Monocytes Absolute   Date Value Ref Range Status   03/26/2019 0.48 0.0 - 1.7 10*3/uL Final     Monocytes, Absolute   Date Value Ref Range Status   12/20/2019 0.80 0.10 - 0.90 10*3/mm3 Final     Eosinophils Absolute   Date Value Ref Range Status   03/26/2019 0.17 0.0 - 0.8 10*3/uL Final     Eosinophils, Absolute   Date Value Ref Range Status   12/20/2019 0.08 0.00 - 0.40 10*3/mm3 Final     Basophils Absolute   Date Value Ref Range Status   03/26/2019 0.02 0.0 - 0.2 10*3/uL Final     Basophils, Absolute   Date Value Ref Range Status   12/20/2019 0.03 0.00 - 0.20 10*3/mm3 Final     Immature Grans, Absolute   Date Value Ref Range Status   12/20/2019 0.04 0.00 - 0.05 10*3/mm3 Final   03/26/2019 0.01 <1 10*3/uL Final     nRBC   Date Value Ref Range Status   12/20/2019 0.0 0.0 - 0.2 /100 WBC Final       Lab Results   Component Value Date    HGBA1C 6.40 (H) 03/18/2020       Lab Results   Component Value Date    [id number] 864 (H) 07/22/2018       TSH   Date Value Ref Range Status   09/16/2019 0.716 0.450 - 4.500 uIU/mL Final       No results found for: CHOL  Lab Results   Component Value Date    TRIG 60 09/16/2019     Lab Results   Component Value Date    HDL 64 09/16/2019     Lab Results   Component Value Date     (H) 09/16/2019     Lab Results   Component Value Date    VLDL 12 09/16/2019     No results found for: LDLHDL      Procedures    Assessment/Plan   Problems Addressed this Visit        Other    Unintentional weight loss    Leg swelling - Primary        Start lasix 2 once a day prn swelling.  Continue potassium.    No orders of the defined types were placed in this encounter.      Current Outpatient Medications   Medication Sig Dispense Refill   • furosemide (LASIX)  20 MG tablet Take 1 tablet by mouth Daily. 90 tablet 3   • pantoprazole (PROTONIX) 40 MG EC tablet One  tablet 1   • potassium chloride (K-DUR) 10 MEQ CR tablet Take 1 tablet by mouth Daily. 90 tablet 3   • senna-docusate sodium (SENOKOT-S) 8.6-50 MG tablet Take 2 tablets by mouth Daily. For constipation 60 tablet 3   • triamcinolone (KENALOG) 0.1 % cream Apply  topically to the appropriate area as directed 2 (Two) Times a Day. 80 g 2     No current facility-administered medications for this visit.        Leander Mayes had no medications administered during this visit.    Return in about 1 month (around 5/20/2020).    There are no Patient Instructions on file for this visit.

## 2020-06-05 NOTE — TELEPHONE ENCOUNTER
PT IS WANTING TO KNOW IF DR. HARRIS STILL WANTS HIM TO GO TO A CANCER DOCTOR. PT HAS BEEN RETURNING DR. MOREL CALL SINCE Tuesday.

## 2020-06-05 NOTE — TELEPHONE ENCOUNTER
Yes.  Make sure he has an appointment to see one from the hospital.  If not, let me know and I can set one up.

## 2020-06-08 NOTE — TELEPHONE ENCOUNTER
I was out of the office on Friday.   I just called patient and daughter stated he passed away on Saturday.

## 2020-09-07 RX ORDER — POTASSIUM CHLORIDE 750 MG/1
TABLET, FILM COATED, EXTENDED RELEASE ORAL
Qty: 90 TABLET | Refills: 3 | OUTPATIENT
Start: 2020-09-07